# Patient Record
Sex: MALE | Race: WHITE | HISPANIC OR LATINO | URBAN - METROPOLITAN AREA
[De-identification: names, ages, dates, MRNs, and addresses within clinical notes are randomized per-mention and may not be internally consistent; named-entity substitution may affect disease eponyms.]

---

## 2020-06-16 ENCOUNTER — INPATIENT (INPATIENT)
Facility: HOSPITAL | Age: 70
LOS: 5 days | Discharge: HOME | DRG: 726 | End: 2020-06-22
Attending: INTERNAL MEDICINE | Admitting: HOSPITALIST
Payer: MEDICARE

## 2020-06-16 VITALS
RESPIRATION RATE: 18 BRPM | TEMPERATURE: 98 F | HEIGHT: 68.9 IN | WEIGHT: 235.01 LBS | SYSTOLIC BLOOD PRESSURE: 195 MMHG | DIASTOLIC BLOOD PRESSURE: 99 MMHG | OXYGEN SATURATION: 97 % | HEART RATE: 84 BPM

## 2020-06-16 LAB
ALBUMIN SERPL ELPH-MCNC: 4.3 G/DL — SIGNIFICANT CHANGE UP (ref 3.3–5.2)
ALP SERPL-CCNC: 92 U/L — SIGNIFICANT CHANGE UP (ref 40–120)
ALT FLD-CCNC: 34 U/L — SIGNIFICANT CHANGE UP
ANION GAP SERPL CALC-SCNC: 10 MMOL/L — SIGNIFICANT CHANGE UP (ref 5–17)
APPEARANCE UR: ABNORMAL
APTT BLD: 29.7 SEC — SIGNIFICANT CHANGE UP (ref 27.5–36.3)
AST SERPL-CCNC: 36 U/L — SIGNIFICANT CHANGE UP
BACTERIA # UR AUTO: ABNORMAL
BASOPHILS # BLD AUTO: 0.04 K/UL — SIGNIFICANT CHANGE UP (ref 0–0.2)
BASOPHILS NFR BLD AUTO: 0.4 % — SIGNIFICANT CHANGE UP (ref 0–2)
BILIRUB SERPL-MCNC: 0.6 MG/DL — SIGNIFICANT CHANGE UP (ref 0.4–2)
BILIRUB UR-MCNC: NEGATIVE — SIGNIFICANT CHANGE UP
BUN SERPL-MCNC: 8 MG/DL — SIGNIFICANT CHANGE UP (ref 8–20)
CALCIUM SERPL-MCNC: 9.4 MG/DL — SIGNIFICANT CHANGE UP (ref 8.6–10.2)
CHLORIDE SERPL-SCNC: 98 MMOL/L — SIGNIFICANT CHANGE UP (ref 98–107)
CO2 SERPL-SCNC: 27 MMOL/L — SIGNIFICANT CHANGE UP (ref 22–29)
COLOR SPEC: ABNORMAL
CREAT SERPL-MCNC: 0.55 MG/DL — SIGNIFICANT CHANGE UP (ref 0.5–1.3)
DIFF PNL FLD: ABNORMAL
EOSINOPHIL # BLD AUTO: 0.31 K/UL — SIGNIFICANT CHANGE UP (ref 0–0.5)
EOSINOPHIL NFR BLD AUTO: 3.1 % — SIGNIFICANT CHANGE UP (ref 0–6)
GLUCOSE SERPL-MCNC: 140 MG/DL — HIGH (ref 70–99)
GLUCOSE UR QL: NEGATIVE — SIGNIFICANT CHANGE UP
HCT VFR BLD CALC: 43.5 % — SIGNIFICANT CHANGE UP (ref 39–50)
HGB BLD-MCNC: 14.9 G/DL — SIGNIFICANT CHANGE UP (ref 13–17)
IMM GRANULOCYTES NFR BLD AUTO: 0.2 % — SIGNIFICANT CHANGE UP (ref 0–1.5)
INR BLD: 1.1 RATIO — SIGNIFICANT CHANGE UP (ref 0.88–1.16)
KETONES UR-MCNC: ABNORMAL
LEUKOCYTE ESTERASE UR-ACNC: ABNORMAL
LYMPHOCYTES # BLD AUTO: 2.48 K/UL — SIGNIFICANT CHANGE UP (ref 1–3.3)
LYMPHOCYTES # BLD AUTO: 24.5 % — SIGNIFICANT CHANGE UP (ref 13–44)
MCHC RBC-ENTMCNC: 30.3 PG — SIGNIFICANT CHANGE UP (ref 27–34)
MCHC RBC-ENTMCNC: 34.3 GM/DL — SIGNIFICANT CHANGE UP (ref 32–36)
MCV RBC AUTO: 88.6 FL — SIGNIFICANT CHANGE UP (ref 80–100)
MONOCYTES # BLD AUTO: 0.62 K/UL — SIGNIFICANT CHANGE UP (ref 0–0.9)
MONOCYTES NFR BLD AUTO: 6.1 % — SIGNIFICANT CHANGE UP (ref 2–14)
NEUTROPHILS # BLD AUTO: 6.64 K/UL — SIGNIFICANT CHANGE UP (ref 1.8–7.4)
NEUTROPHILS NFR BLD AUTO: 65.7 % — SIGNIFICANT CHANGE UP (ref 43–77)
NITRITE UR-MCNC: POSITIVE
PH UR: 7 — SIGNIFICANT CHANGE UP (ref 5–8)
PLATELET # BLD AUTO: 202 K/UL — SIGNIFICANT CHANGE UP (ref 150–400)
POTASSIUM SERPL-MCNC: 3.8 MMOL/L — SIGNIFICANT CHANGE UP (ref 3.5–5.3)
POTASSIUM SERPL-SCNC: 3.8 MMOL/L — SIGNIFICANT CHANGE UP (ref 3.5–5.3)
PROT SERPL-MCNC: 7.3 G/DL — SIGNIFICANT CHANGE UP (ref 6.6–8.7)
PROT UR-MCNC: 500 MG/DL
PROTHROM AB SERPL-ACNC: 12.5 SEC — SIGNIFICANT CHANGE UP (ref 10–12.9)
RBC # BLD: 4.91 M/UL — SIGNIFICANT CHANGE UP (ref 4.2–5.8)
RBC # FLD: 12.4 % — SIGNIFICANT CHANGE UP (ref 10.3–14.5)
RBC CASTS # UR COMP ASSIST: >50 /HPF (ref 0–4)
SODIUM SERPL-SCNC: 134 MMOL/L — LOW (ref 135–145)
SP GR SPEC: 1 — LOW (ref 1.01–1.02)
UROBILINOGEN FLD QL: NEGATIVE — SIGNIFICANT CHANGE UP
WBC # BLD: 10.11 K/UL — SIGNIFICANT CHANGE UP (ref 3.8–10.5)
WBC # FLD AUTO: 10.11 K/UL — SIGNIFICANT CHANGE UP (ref 3.8–10.5)
WBC UR QL: SIGNIFICANT CHANGE UP

## 2020-06-16 PROCEDURE — 74176 CT ABD & PELVIS W/O CONTRAST: CPT | Mod: 26

## 2020-06-16 PROCEDURE — 99285 EMERGENCY DEPT VISIT HI MDM: CPT

## 2020-06-16 RX ORDER — DIAZEPAM 5 MG
5 TABLET ORAL ONCE
Refills: 0 | Status: DISCONTINUED | OUTPATIENT
Start: 2020-06-16 | End: 2020-06-16

## 2020-06-16 NOTE — ED PROVIDER NOTE - CLINICAL SUMMARY MEDICAL DECISION MAKING FREE TEXT BOX
Pt with disha blood in urine that did not improved with multiple rounds of CBI, pt discussed with hospitalist, that wants to have pt admitted for further evaluation and possible intervention, pt made aware of need for admission and possible further treatments, pt states that they agree with need for admission and they understand all results and possible treatments. PT will be admitted to hospitalist team and care will be transferred to admitting team.

## 2020-06-16 NOTE — ED PROVIDER NOTE - ATTENDING CONTRIBUTION TO CARE
I personally saw the patient with the PA, and completed the key components of the history and physical exam. I then discussed the management plan with the PA.   gen in nad resp clear cardiac no murmru abd mild ttp asupaubic region no g/r/r no cvat neuro intact   agree with pa plan of care

## 2020-06-16 NOTE — ED PROVIDER NOTE - OBJECTIVE STATEMENT
PT with SPMHX of DM, HTN, BPH, presents to the ED with complaint of bloody urine over the last 24 hr. Pt states that he had small amount of blood that has gotten progressively worse darker red and increased amount of clots in urine. Pt states that he is now having difficulty passing urine. Pt states that he is having moderate amount of pain that feels like pressure that is in his lower abd constat progressively worse, improved with urinations, and is non radiating. Pt dines fever, chills, anticoagulation use, SOB, diff breathing, HA, dizziness, cough, back pain.

## 2020-06-16 NOTE — ED ADULT TRIAGE NOTE - CHIEF COMPLAINT QUOTE
pt presents to the ed c/o hematuria that began today, pain on urination, burning, Pt states bright red urine and hx of prostate problems. pt has no other c/o at this time.

## 2020-06-17 DIAGNOSIS — N32.89 OTHER SPECIFIED DISORDERS OF BLADDER: ICD-10-CM

## 2020-06-17 DIAGNOSIS — Z90.49 ACQUIRED ABSENCE OF OTHER SPECIFIED PARTS OF DIGESTIVE TRACT: Chronic | ICD-10-CM

## 2020-06-17 DIAGNOSIS — I10 ESSENTIAL (PRIMARY) HYPERTENSION: ICD-10-CM

## 2020-06-17 DIAGNOSIS — R31.0 GROSS HEMATURIA: ICD-10-CM

## 2020-06-17 DIAGNOSIS — R31.9 HEMATURIA, UNSPECIFIED: ICD-10-CM
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
## 2020-06-17 DIAGNOSIS — E11.9 TYPE 2 DIABETES MELLITUS WITHOUT COMPLICATIONS: ICD-10-CM

## 2020-06-17 LAB
BLD GP AB SCN SERPL QL: SIGNIFICANT CHANGE UP
GLUCOSE BLDC GLUCOMTR-MCNC: 136 MG/DL — HIGH (ref 70–99)
GLUCOSE BLDC GLUCOMTR-MCNC: 155 MG/DL — HIGH (ref 70–99)
GLUCOSE BLDC GLUCOMTR-MCNC: 159 MG/DL — HIGH (ref 70–99)
GLUCOSE BLDC GLUCOMTR-MCNC: 175 MG/DL — HIGH (ref 70–99)
SARS-COV-2 IGG SERPL QL IA: NEGATIVE — SIGNIFICANT CHANGE UP
SARS-COV-2 IGM SERPL IA-ACNC: <0.1 INDEX — SIGNIFICANT CHANGE UP
SARS-COV-2 RNA SPEC QL NAA+PROBE: SIGNIFICANT CHANGE UP

## 2020-06-17 PROCEDURE — 12345: CPT | Mod: NC,GC

## 2020-06-17 PROCEDURE — 99222 1ST HOSP IP/OBS MODERATE 55: CPT

## 2020-06-17 PROCEDURE — 51703 INSERT BLADDER CATH COMPLEX: CPT

## 2020-06-17 PROCEDURE — 99223 1ST HOSP IP/OBS HIGH 75: CPT

## 2020-06-17 RX ORDER — ONDANSETRON 8 MG/1
4 TABLET, FILM COATED ORAL EVERY 6 HOURS
Refills: 0 | Status: DISCONTINUED | OUTPATIENT
Start: 2020-06-17 | End: 2020-06-22

## 2020-06-17 RX ORDER — INSULIN LISPRO 100/ML
2 VIAL (ML) SUBCUTANEOUS
Refills: 0 | Status: DISCONTINUED | OUTPATIENT
Start: 2020-06-17 | End: 2020-06-17

## 2020-06-17 RX ORDER — SODIUM CHLORIDE 9 MG/ML
3 INJECTION INTRAMUSCULAR; INTRAVENOUS; SUBCUTANEOUS EVERY 8 HOURS
Refills: 0 | Status: DISCONTINUED | OUTPATIENT
Start: 2020-06-17 | End: 2020-06-22

## 2020-06-17 RX ORDER — INSULIN GLARGINE 100 [IU]/ML
10 INJECTION, SOLUTION SUBCUTANEOUS AT BEDTIME
Refills: 0 | Status: DISCONTINUED | OUTPATIENT
Start: 2020-06-17 | End: 2020-06-18

## 2020-06-17 RX ORDER — GLUCAGON INJECTION, SOLUTION 0.5 MG/.1ML
1 INJECTION, SOLUTION SUBCUTANEOUS ONCE
Refills: 0 | Status: DISCONTINUED | OUTPATIENT
Start: 2020-06-17 | End: 2020-06-22

## 2020-06-17 RX ORDER — DEXTROSE 50 % IN WATER 50 %
12.5 SYRINGE (ML) INTRAVENOUS ONCE
Refills: 0 | Status: DISCONTINUED | OUTPATIENT
Start: 2020-06-17 | End: 2020-06-22

## 2020-06-17 RX ORDER — TAMSULOSIN HYDROCHLORIDE 0.4 MG/1
0.8 CAPSULE ORAL AT BEDTIME
Refills: 0 | Status: DISCONTINUED | OUTPATIENT
Start: 2020-06-17 | End: 2020-06-22

## 2020-06-17 RX ORDER — METFORMIN HYDROCHLORIDE 850 MG/1
1 TABLET ORAL
Qty: 0 | Refills: 0 | DISCHARGE

## 2020-06-17 RX ORDER — HYDRALAZINE HCL 50 MG
10 TABLET ORAL EVERY 6 HOURS
Refills: 0 | Status: DISCONTINUED | OUTPATIENT
Start: 2020-06-17 | End: 2020-06-22

## 2020-06-17 RX ORDER — GLIMEPIRIDE 1 MG
1 TABLET ORAL
Qty: 0 | Refills: 0 | DISCHARGE

## 2020-06-17 RX ORDER — DEXTROSE 50 % IN WATER 50 %
25 SYRINGE (ML) INTRAVENOUS ONCE
Refills: 0 | Status: DISCONTINUED | OUTPATIENT
Start: 2020-06-17 | End: 2020-06-22

## 2020-06-17 RX ORDER — AMLODIPINE BESYLATE 2.5 MG/1
5 TABLET ORAL DAILY
Refills: 0 | Status: DISCONTINUED | OUTPATIENT
Start: 2020-06-17 | End: 2020-06-17

## 2020-06-17 RX ORDER — AMLODIPINE BESYLATE 2.5 MG/1
10 TABLET ORAL DAILY
Refills: 0 | Status: DISCONTINUED | OUTPATIENT
Start: 2020-06-18 | End: 2020-06-22

## 2020-06-17 RX ORDER — INSULIN LISPRO 100/ML
VIAL (ML) SUBCUTANEOUS
Refills: 0 | Status: DISCONTINUED | OUTPATIENT
Start: 2020-06-17 | End: 2020-06-19

## 2020-06-17 RX ORDER — SODIUM CHLORIDE 9 MG/ML
1000 INJECTION, SOLUTION INTRAVENOUS
Refills: 0 | Status: DISCONTINUED | OUTPATIENT
Start: 2020-06-17 | End: 2020-06-22

## 2020-06-17 RX ORDER — FINASTERIDE 5 MG/1
5 TABLET, FILM COATED ORAL DAILY
Refills: 0 | Status: DISCONTINUED | OUTPATIENT
Start: 2020-06-17 | End: 2020-06-22

## 2020-06-17 RX ORDER — DEXTROSE 50 % IN WATER 50 %
15 SYRINGE (ML) INTRAVENOUS ONCE
Refills: 0 | Status: DISCONTINUED | OUTPATIENT
Start: 2020-06-17 | End: 2020-06-22

## 2020-06-17 RX ADMIN — Medication 2 UNIT(S): at 12:03

## 2020-06-17 RX ADMIN — AMLODIPINE BESYLATE 5 MILLIGRAM(S): 2.5 TABLET ORAL at 05:56

## 2020-06-17 RX ADMIN — Medication 2: at 16:59

## 2020-06-17 RX ADMIN — TAMSULOSIN HYDROCHLORIDE 0.8 MILLIGRAM(S): 0.4 CAPSULE ORAL at 22:48

## 2020-06-17 RX ADMIN — Medication 2: at 22:48

## 2020-06-17 RX ADMIN — Medication 2: at 08:36

## 2020-06-17 RX ADMIN — SODIUM CHLORIDE 3 MILLILITER(S): 9 INJECTION INTRAMUSCULAR; INTRAVENOUS; SUBCUTANEOUS at 05:16

## 2020-06-17 RX ADMIN — Medication 2 UNIT(S): at 08:36

## 2020-06-17 RX ADMIN — INSULIN GLARGINE 10 UNIT(S): 100 INJECTION, SOLUTION SUBCUTANEOUS at 22:47

## 2020-06-17 RX ADMIN — SODIUM CHLORIDE 3 MILLILITER(S): 9 INJECTION INTRAMUSCULAR; INTRAVENOUS; SUBCUTANEOUS at 16:10

## 2020-06-17 RX ADMIN — Medication 20 MILLIGRAM(S): at 05:56

## 2020-06-17 RX ADMIN — SODIUM CHLORIDE 3 MILLILITER(S): 9 INJECTION INTRAMUSCULAR; INTRAVENOUS; SUBCUTANEOUS at 22:48

## 2020-06-17 NOTE — CONSULT NOTE ADULT - ATTENDING COMMENTS
patient had a bustamante which was too small. gross hematuria. Has been seen many months ago by Dr. Wade.  On tamsulosin.  Never on finasteride.  Had weak stream with feeling of incomplete emptying.  Change to a larger 3-way bustamante. Irrigate and CBI  Will try to start in finasteride and wean CBI

## 2020-06-17 NOTE — CHART NOTE - NSCHARTNOTEFT_GEN_A_CORE
70y Male,   Patient is a 70y old  Male who presents with a chief complaint of Gross Hematuria  Bladder Mass (17 Jun 2020 09:45)    INTERVAL/OVERNIGHT EVENTS:    Patient examined at beside.  - Bustamante cath clogged, Urology PA at bedside for bustamante change. 22Fr 3 lumen bustamante placed, actively draining.  Patient states he smoked for 5 years 2-5  cigarettes/ day and stopped 30 years ago, denied h/o , Textile working, Chemical industries, Leather industries, metal exposure, or working at a rubber industry/ painting. Patient also denied h/o parasitic infections in the past. Patient denies STD's.   Patient states he had hematuria 2 years ago and his PCP referred to urology who started him on Flomax, denies cytoscopy. (Uro: Dr. Wade)  Patient states feeling better compared to admission, reports mild discomfort due tot he fistula.  Patient denies chest pain, calf pain, abdominal pain, headaches, fever, chills, diarrhea, constipation, SOB, palpitations.   Rest of ROS not contributory except for above.        I&O's Summary    16 Jun 2020 07:01  -  17 Jun 2020 07:00  --------------------------------------------------------  IN: 3000 mL / OUT: 4750 mL / NET: -1750 mL    17 Jun 2020 07:01  -  17 Jun 2020 15:30  --------------------------------------------------------  IN: 78210 mL / OUT: 19198 mL / NET: -1700 mL    Daily Height in cm: 175 (16 Jun 2020 21:38)    Daily     CAPILLARY BLOOD GLUCOSE  POCT Blood Glucose.: 136 mg/dL (17 Jun 2020 11:50)  POCT Blood Glucose.: 175 mg/dL (17 Jun 2020 08:13)      T(C): 36.8 (06-17-20 @ 07:53), Max: 36.8 (06-17-20 @ 05:55)  HR: 87 (06-17-20 @ 07:53) (76 - 87)  BP: 172/94 (06-17-20 @ 07:53) (160/76 - 195/99)  RR: 18 (06-17-20 @ 07:53) (18 - 20)  SpO2: 96% (06-17-20 @ 07:53) (96% - 98%)      PHYSICAL EXAM  GENERAL: AA&O  x 3. NAD.   HEENT: Head; normocephalic, atraumatic, PERRLA, EOMI  NECK: Supple.   CARDIOVASCULAR: RRR. S1/s2 + No murmurs/ rubs/ gallops  RESPIRATORY: CLTA  GASTROINTESTINAL: Soft, no tenderness on palpation. Bs present on 4 quadrants.   : 3 lumen bustamante actively draining dark pink urine.   EXTREMITIES: No clubbing, cyanosis or edema. No calf tenderness. Distal pulses wnl.   MUSCULOSKELETAL: 5/5 muscle strength in UE and LE.   SKIN: warm and dry with normal turgor.  NEURO: No focal deficits.   PSYCH: normal affect.        LABS                          14.9   10.11 )-----------( 202      ( 16 Jun 2020 23:22 )             43.5         06-16    134<L>  |  98  |  8.0  ----------------------------<  140<H>  3.8   |  27.0  |  0.55    Ca    9.4      16 Jun 2020 23:22    TPro  7.3  /  Alb  4.3  /  TBili  0.6  /  DBili  x   /  AST  36  /  ALT  34  /  AlkPhos  92  06-16            LIVER FUNCTIONS - ( 16 Jun 2020 23:22 )  Alb: 4.3 g/dL / Pro: 7.3 g/dL / ALK PHOS: 92 U/L / ALT: 34 U/L / AST: 36 U/L / GGT: x             PT/INR - ( 16 Jun 2020 23:22 )   PT: 12.5 sec;   INR: 1.10 ratio         PTT - ( 16 Jun 2020 23:22 )  PTT:29.7 sec        IMAGING  < from: CT Renal Stone Hunt (06.16.20 @ 22:50) >    IMPRESSION:   A 4-5 cm area of hyperdensity along the left posterior bladder wall probably represents hematoma with the given history. Soft tissue mass is also possible.    < end of copied text >    MEDICATIONS  (STANDING):  amLODIPine   Tablet 5 milliGRAM(s) Oral daily  dextrose 5%. 1000 milliLiter(s) (50 mL/Hr) IV Continuous <Continuous>  dextrose 50% Injectable 12.5 Gram(s) IV Push once  dextrose 50% Injectable 25 Gram(s) IV Push once  dextrose 50% Injectable 25 Gram(s) IV Push once  enalapril 20 milliGRAM(s) Oral daily  insulin glargine Injectable (LANTUS) 10 Unit(s) SubCutaneous at bedtime  insulin lispro (HumaLOG) corrective regimen sliding scale   SubCutaneous Before meals and at bedtime  insulin lispro Injectable (HumaLOG) 2 Unit(s) SubCutaneous before breakfast  insulin lispro Injectable (HumaLOG) 2 Unit(s) SubCutaneous before lunch  insulin lispro Injectable (HumaLOG) 2 Unit(s) SubCutaneous before dinner  sodium chloride 0.9% lock flush 3 milliLiter(s) IV Push every 8 hours  tamsulosin 0.8 milliGRAM(s) Oral at bedtime    MEDICATIONS  (PRN):  dextrose 40% Gel 15 Gram(s) Oral once PRN Blood Glucose LESS THAN 70 milliGRAM(s)/deciliter  glucagon  Injectable 1 milliGRAM(s) IntraMuscular once PRN Glucose LESS THAN 70 milligrams/deciliter  ondansetron Injectable 4 milliGRAM(s) IV Push every 6 hours PRN Nausea        ASSESSMENT AND PLAN    71 y/o male with history of DM-2, HTN, HLD and BPH, who presented to ED with CC of suprapubic pain, hematuria, and straining worsening for 1 day. At ED patient was found to be on urinary retention, bustamante placed for decompression and CBI. Hyperthropic Prostate on physical exam. CT abdomen showed a 4- 5 cm hyperdense area in the bladder likely a clot but can't r/o a mass. No hydronephrosis on CT. Urology Following, bustamante to be in place with f/u as outpatient for TO. Regarding CT findings, Urology considers it is likely a clot and will need cytoscopy as Outpt. Also noticed on admission Hypertensive urgency, with BP above goal, will increase Norvasc and f/u, Hydralazine PRN.     Acute Urinary retention complicated with hematuria   - Likely 2/2 BPH  - No anemia.   - S/p bustamante.   - C/w CBI  - C/w Flomax  - Urology eval/ recs noted and appreciated    Abnormal radiologic finding of the bladder  - CT abdomen: 4-5 cm area of hyperdensity along the left posterior bladder wall. Clot Vs Soft tissue mass.  - Patient has h/o hematuria in the past.  - No Cystoscopy done as outpt.  - Will require f/u and cystoscopy  - Patient is aware of findings.    Hypertensive Urgency  - Noted on admission.  - Will increase Norvasc to 10 mg QD  - C/w enalapril   - Hydralazine 10 q6 PRN sbp >170  - BP goal considering h/o T2DM is <140/90  - C/w low DASH/ TLC diet    T2DM  - Unknown A1c, f/u   - On Metformin and glimepiride as outpt. Will hold during this admission  - C/w Lantus and ISS +hypoglycemia protocol  - C/w consistent carb diet.     Prophylactic measures:  - dvt ppx: IMPROVE VTE 1(>61 y/o) low risk for DVT. No AC due to Hematuria.     Dispo: LOS likely 1-2 days pending resolution of hematuria.     - Patient requested his spouse (Anum Farley [phone]).  - Contacted Mrs Farley on 06/17 and updated her regarding diagnosis and management. Post midnight admission brief note. Patient was seen and examined by overnight hospitalist this am. HPI reviewed. Labs, vitals noted. I have personally seen and examined this patient today     Patient seen and examined at bedside.   - Bustamante cath clogged, Urology PA at bedside for bustamante change. 22Fr 3 lumen bustamante placed, actively draining.  Patient states he smoked for 5 years 2-5  cigarettes/ day and stopped 30 years ago, denied h/o , Textile working, Chemical industries, Leather industries, metal exposure, or working at a rubber industry/ painting. Patient also denied h/o parasitic infections in the past. Patient denies STD's.   Patient states he had hematuria 2 years ago and his PCP referred to urology who started him on Flomax, denies cytoscopy. (Uro: Dr. Wade)  Patient states feeling better compared to admission, reports mild discomfort due tot he fistula.  Patient denies chest pain, calf pain, abdominal pain, headaches, fever, chills, diarrhea, constipation, SOB, palpitations.   Rest of ROS not contributory except for above.    CAPILLARY BLOOD GLUCOSE  POCT Blood Glucose.: 136 mg/dL (17 Jun 2020 11:50)  POCT Blood Glucose.: 175 mg/dL (17 Jun 2020 08:13)    VS:   T(C): 36.8 (06-17-20 @ 07:53), Max: 36.8 (06-17-20 @ 05:55)  HR: 87 (06-17-20 @ 07:53) (76 - 87)  BP: 172/94 (06-17-20 @ 07:53) (160/76 - 195/99)  RR: 18 (06-17-20 @ 07:53) (18 - 20)  SpO2: 96% (06-17-20 @ 07:53) (96% - 98%)      PHYSICAL EXAM  GENERAL: AA&O  x 3. NAD.   HEENT: Head; normocephalic, atraumatic, PERRLA, EOMI  CARDIOVASCULAR: RRR. S1/s2 + No murmurs/ rubs/ gallops  RESPIRATORY: CTAB   GASTROINTESTINAL: Soft, no tenderness on palpation. Bs present on 4 quadrants.   : 3 lumen bustamante actively draining dark pink urine.   EXTREMITIES: No clubbing, cyanosis or edema. No calf tenderness. Distal pulses wnl.   MUSCULOSKELETAL: 5/5 muscle strength in UE and LE.   SKIN: warm and dry with normal turgor.      LABS                          14.9   10.11 )-----------( 202      ( 16 Jun 2020 23:22 )             43.5         06-16    134<L>  |  98  |  8.0  ----------------------------<  140<H>  3.8   |  27.0  |  0.55    Ca    9.4      16 Jun 2020 23:22    TPro  7.3  /  Alb  4.3  /  TBili  0.6  /  DBili  x   /  AST  36  /  ALT  34  /  AlkPhos  92  06-16    LIVER FUNCTIONS - ( 16 Jun 2020 23:22 )  Alb: 4.3 g/dL / Pro: 7.3 g/dL / ALK PHOS: 92 U/L / ALT: 34 U/L / AST: 36 U/L / GGT: x           PT/INR - ( 16 Jun 2020 23:22 )   PT: 12.5 sec;   INR: 1.10 ratio    PTT - ( 16 Jun 2020 23:22 )  PTT:29.7 sec    IMAGING  < from: CT Renal Stone Hunt (06.16.20 @ 22:50) >    IMPRESSION:   A 4-5 cm area of hyperdensity along the left posterior bladder wall probably represents hematoma with the given history. Soft tissue mass is also possible.    ASSESSMENT AND PLAN    69 y/o male with history of DM-2, HTN, HLD and BPH, who presented to ED with CC of suprapubic pain, hematuria, and straining worsening for 1 day. At ED patient was found to be on urinary retention, bustamante placed for decompression and CBI. Hyperthropic Prostate on physical exam. CT abdomen showed a 4- 5 cm hyperdense area in the bladder likely a clot but can't r/o a mass. No hydronephrosis on CT. Urology Following, bustamante to be in place with f/u as outpatient for TOV. Regarding CT findings, Urology considers it is likely a clot and will need cytoscopy as Outpt. Also noticed on admission Hypertensive urgency, with BP above goal, will increase Norvasc and f/u, Hydralazine PRN.     Acute Urinary retention complicated with hematuria   in the setting of an abnormal hyperdensity in the bladder (mass vs clot)   - Likely 2/2 BPH  - No anemia or UTI sx   - S/p bustamante.   - C/w CBI  - C/w Flomax  - Urology eval/ recs noted and appreciated  -OP cysto findings     Hypertensive Urgency- improving slowly   - Noted on admission.  - Will increase Norvasc to 10 mg QD  - C/w enalapril   - Hydralazine 10 q6 PRN sbp >170  - BP goal considering h/o T2DM is <140/90  - C/w low DASH/ TLC diet    T2DM  - Unknown A1c, f/u   - On Metformin and glimepiride as outpt. Will hold during this admission  - C/w Lantus and ISS +hypoglycemia protocol  - C/w consistent carb diet.     Prophylactic measures:  - dvt ppx: IMPROVE VTE 1(>61 y/o) low risk for DVT. No AC due to Hematuria.     Dispo: LOS likely 1-2 days pending resolution of hematuria.     - Patient requested his spouse (Anum Farley [phone]).  - Contacted Mrs Farley on 06/17 and updated her regarding diagnosis and management.  HPI noted. Will follow.

## 2020-06-17 NOTE — H&P ADULT - NSICDXPASTMEDICALHX_GEN_ALL_CORE_FT
PAST MEDICAL HISTORY:  BPH (benign prostatic hyperplasia)     DM (diabetes mellitus)     HTN (hypertension)

## 2020-06-17 NOTE — PROCEDURE NOTE - NSICDXPROCEDURE_GEN_ALL_CORE_FT
PROCEDURES:  Continuous irrigation of bladder using 3-way Méndez catheter 18-Jun-2020 07:47:07  Jeannie Sethi  Complex catheterization of urethra 18-Jun-2020 07:46:49  Jeannie Sethi

## 2020-06-17 NOTE — PROGRESS NOTE ADULT - ASSESSMENT
71 yo male with BPH, clot retention, hematuria  - bustamante irrigated, some clots removed, unable to irrigate adequately  - bustamante cath changed to 22Fr 3 way, irrigated with 1.5L water, significant amount of clots removed, urine more pink.  CBI restarted, flowing well, urine draining well  - cont CBI at moderate rate  - will monitor and cont. to follow 69 yo male with BPH, clot retention, hematuria  - bustamante irrigated, some clots removed, unable to irrigate adequately  - bustamante cath changed to 22Fr 3 way, irrigated with 1.5L water, significant amount of clots removed, urine more pink.  CBI restarted, flowing well, urine draining well  - cont CBI at moderate rate  - phimosis reduced  - will monitor and cont. to follow

## 2020-06-17 NOTE — CONSULT NOTE ADULT - ASSESSMENT
69yo male with hematuria and findings of CT of left posterior bladder wall mass vs hematoma.   - Continue Méndez with CBI  - Trend H/H  - U/A positive for infection along with hematuria, recommend Macrobid or Bactrim per primary team  - Will consider repeat imaging to further delineate left posterior bladder wall mass vs hematoma  - Urology will continue to follow 69yo male with hematuria and findings of CT of left posterior bladder wall mass vs hematoma.   - Continue Méndez with CBI  - Urine culture  - Trend H/H  - U/A positive for infection along with hematuria, recommend Bactrim   - Will consider repeat imaging to further delineate left posterior bladder wall mass vs hematoma  - Urology will continue to follow  - Plan discussed with Dr. Dc, whom agrees

## 2020-06-17 NOTE — ED ADULT NURSE NOTE - NSIMPLEMENTINTERV_GEN_ALL_ED
Implemented All Universal Safety Interventions:  [place] to call system. Call bell, personal items and telephone within reach. Instruct patient to call for assistance. Room bathroom lighting operational. Non-slip footwear when patient is off stretcher. Physically safe environment: no spills, clutter or unnecessary equipment. Stretcher in lowest position, wheels locked, appropriate side rails in place.

## 2020-06-17 NOTE — PROGRESS NOTE ADULT - SUBJECTIVE AND OBJECTIVE BOX
Subjective:70yMale admitted for gross hematuria and clot retention yesterday.  pt had 3 way 18Fr bustamante in place.  Pt uncomfortable this am, c/o suprapubic pain.  CBI open but not running.    Bustamante: hematuria in bag    Vital Signs Last 24 Hrs  T(C): 36.8 (17 Jun 2020 07:53), Max: 36.8 (17 Jun 2020 05:55)  T(F): 98.2 (17 Jun 2020 07:53), Max: 98.3 (17 Jun 2020 05:55)  HR: 87 (17 Jun 2020 07:53) (76 - 87)  BP: 172/94 (17 Jun 2020 07:53) (160/76 - 195/99)  BP(mean): 104 (17 Jun 2020 05:18) (104 - 104)  RR: 18 (17 Jun 2020 07:53) (18 - 20)  SpO2: 96% (17 Jun 2020 07:53) (96% - 98%)  I&O's Detail    16 Jun 2020 07:01  -  17 Jun 2020 07:00  --------------------------------------------------------  IN:    Continuous Bladder Irrigation: 3000 mL  Total IN: 3000 mL    OUT:    Continuous Bladder Irrigation: 4750 mL  Total OUT: 4750 mL    Total NET: -1750 mL      17 Jun 2020 07:01  -  17 Jun 2020 10:17  --------------------------------------------------------  IN:    Continuous Bladder Irrigation: 3000 mL  Total IN: 3000 mL    OUT:    Continuous Bladder Irrigation: 2700 mL  Total OUT: 2700 mL    Total NET: 300 mL          Labs:                        14.9   10.11 )-----------( 202      ( 16 Jun 2020 23:22 )             43.5     06-16    134<L>  |  98  |  8.0  ----------------------------<  140<H>  3.8   |  27.0  |  0.55    Ca    9.4      16 Jun 2020 23:22    TPro  7.3  /  Alb  4.3  /  TBili  0.6  /  DBili  x   /  AST  36  /  ALT  34  /  AlkPhos  92  06-16    PT/INR - ( 16 Jun 2020 23:22 )   PT: 12.5 sec;   INR: 1.10 ratio         PTT - ( 16 Jun 2020 23:22 )  PTT:29.7 sec

## 2020-06-17 NOTE — PROCEDURE NOTE - NSURITECHNIQUE_GU_A_CORE
The collection bag is below the level of the patient and urinary bladder/Proper hand hygiene was performed/Sterile gloves were worn for the duration of the procedure/A sterile drape was used to cover all adjacent areas/The site was cleaned with soap/water and sterile solution (betadine)/The catheter was secured with a securement device (e.g. StatLock)/The urinary drainage system is closed at the end of the procedure/All applicable medical record documentation is completed/The catheter was appropriately lubricated

## 2020-06-17 NOTE — PROCEDURE NOTE - NSINDICATIONS_GEN_A_CORE
urinry obstruction or retention/non functioning 18Fr catheter/continuous bladder irrigation/bladder distention

## 2020-06-17 NOTE — CONSULT NOTE ADULT - SUBJECTIVE AND OBJECTIVE BOX
UROLOGY CONSULT    Consulting surgical team: Urology  Consulting attending: Dr. Dc  Patient seen and examined: 20 @ 03:08    HPI:  71yo male presents with complaints of hematuria for the past 24 hours causing difficulty urinating and with bouts of bright red blood and clots in his urine. Patient reports mild pain with straining on urination. Reports he has been taking a medication called Anadin for a mild tooth pain for the past 3 days, said medication is an Aspirin containing med. Patient denies weight loss, loss of appettite or previous episodes of hematuria. Currently, denies chest pain, SOB, nausea, emesis, fevers or chills.     PAST MEDICAL HISTORY:  BPH (benign prostatic hyperplasia)  DM (diabetes mellitus)  HTN (hypertension)      PAST SURGICAL HISTORY:  No significant past surgical history      ALLERGIES:  No Known Allergies      FAMILY HISTORY:  noncontributory    SOCIAL HISTORY:  Denies toxic habits    HOME MEDICATIONS:  Glimepiride 4mg BID  Enalapril 20mg daily  Metformin 500mg daily  Tamsulosin 0.8mg daily  Amlodipine 5mg daily    MEDICATIONS  (STANDING):    MEDICATIONS  (PRN):      VITALS & I/Os:  Vital Signs Last 24 Hrs  T(C): 36.7 (2020 21:38), Max: 36.7 (2020 21:38)  T(F): 98.1 (2020 21:38), Max: 98.1 (2020 21:38)  HR: 84 (2020 21:38) (84 - 84)  BP: 195/99 (2020 21:38) (195/99 - 195/99)  BP(mean): --  RR: 18 (2020 21:38) (18 - 18)  SpO2: 97% (2020 21:38) (97% - 97%)  CAPILLARY BLOOD GLUCOSE          I&O's Summary      GENERAL: Alert, in no acute distress.  MENTAL STATUS: AAOx3. Appropriate affect.  HEENT: PERRLA. EOMI  RESPIRATORY: CTAB  CARDIOVASCULAR: RRR  GASTROINTESTINAL: Abdomen soft, NT, ND, -R/-G.  No suprapubic tenderness, testicles palpated b/l, no skin changes noted on genitals, has CBI Méndez placed with pink urine output  MUSCULOSKELETAL: No gross deformities.       LABS:                        14.9   10.11 )-----------( 202      ( 2020 23:22 )             43.5     06-16    134<L>  |  98  |  8.0  ----------------------------<  140<H>  3.8   |  27.0  |  0.55    Ca    9.4      2020 23:22    TPro  7.3  /  Alb  4.3  /  TBili  0.6  /  DBili  x   /  AST  36  /  ALT  34  /  AlkPhos  92  -16    Lactate:    PT/INR - ( 2020 23:22 )   PT: 12.5 sec;   INR: 1.10 ratio         PTT - ( 2020 23:22 )  PTT:29.7 sec          Urinalysis Basic - ( 2020 23:22 )    Color: Red / Appearance: Cloudy / S.005 / pH: x  Gluc: x / Ketone: Trace  / Bili: Negative / Urobili: Negative   Blood: x / Protein: 500 mg/dL / Nitrite: Positive   Leuk Esterase: Trace / RBC: >50 /HPF / WBC 0-2   Sq Epi: x / Non Sq Epi: x / Bacteria: Occasional        IMAGING:  < from: CT Renal Stone Hunt (20 @ 22:50) >     EXAM:  CT RENAL STONE HUNT                          PROCEDURE DATE:  2020          INTERPRETATION:  CLINICAL INFORMATION:  disha blood in urine    COMPARISON: None.    PROCEDURE:   CT of the Abdomen and pelvis was performed without intravenous contrast.   Intravenous contrast: None.  Oral contrast: None.  Sagittal and coronal reformats were performed.    FINDINGS:  LOWER CHEST: Small amount of pleural thickening, calcification, and fluid on the right. Calcified left mediastinal lymph nodes.    LIVER: Fatty, enlarged liver.  BILE DUCTS: Normal caliber.  GALLBLADDER: Cholecystectomy.  SPLEEN: Within normal limits.  PANCREAS: Within normal limits.  ADRENALS: Within normal limits.  KIDNEYS/URETERS: Within normal limits.    Bladder: 3.1 x4.6 x 3.4 cm. Hyperdensity along the left posterior bladder wall.  Reproductive: Enlarged prostate.  BOWEL: Diffuse colonic diverticulosis. Normal appendix.   PERITONEUM: No ascites.  VESSELS: Within normal limits.  RETROPERITONEUM/LYMPH NODES: No lymphadenopathy.    ABDOMINAL WALL: Within normal limits.  BONES: Moderate degenerative disc disease of the thoracolumbar spine. Bilateral L5 pars defects with grade 1 anterolisthesis of L5 upon S1.    IMPRESSION:   A 4-5 cm area of hyperdensity along the left posterior bladder wall probably represents hematoma with the given history. Soft tissue mass is also possible.                AUSTEN AMADOR   This document has been electronically signed. 2020 11:08PM                  < end of copied text >

## 2020-06-17 NOTE — ED ADULT NURSE NOTE - OBJECTIVE STATEMENT
Patient complaining of urinating blood which started today. He states that it started as a small amount of blood progressing to bright red blood this afternoon. Patient denies pain or burning and fever. Pt states he has a history of prostate problems.

## 2020-06-17 NOTE — H&P ADULT - HISTORY OF PRESENT ILLNESS
71 y/o male with history of DM-2, HTN, HLD, BPH presents with 1 day of pain and pressure in suprapubic area associated with gross hematuria. Denies any fever, chills, SOB, CP. No trauma reported. Denies this ever happening before. Denies use of antiplatelet or anti-coagulants. In the ED patient with gross hematuria, U/A with no WBC, normal CBC. CT abd/pelvis showed large bladder hematoma vs bladder mass. CBI catheter placed and patient feels better. Seen by Urology Resident and awaiting formal eval by Urology attending.

## 2020-06-17 NOTE — H&P ADULT - PROBLEM SELECTOR PLAN 1
Admit to medical bed, cont. CBI, monitor H/H, Await urology attending eval for likely cystoscopy to evaluate CT findings. No chemical DVT-P, U/A with 0 WBC not c/w infection. No role for Abx at this time. Send urine for cytology. OOB, Ambulate

## 2020-06-18 ENCOUNTER — TRANSCRIPTION ENCOUNTER (OUTPATIENT)
Age: 70
End: 2020-06-18

## 2020-06-18 LAB
A1C WITH ESTIMATED AVERAGE GLUCOSE RESULT: 8.7 % — HIGH (ref 4–5.6)
ANION GAP SERPL CALC-SCNC: 13 MMOL/L — SIGNIFICANT CHANGE UP (ref 5–17)
BASOPHILS # BLD AUTO: 0.01 K/UL — SIGNIFICANT CHANGE UP (ref 0–0.2)
BASOPHILS NFR BLD AUTO: 0.1 % — SIGNIFICANT CHANGE UP (ref 0–2)
BUN SERPL-MCNC: 13 MG/DL — SIGNIFICANT CHANGE UP (ref 8–20)
CALCIUM SERPL-MCNC: 8.9 MG/DL — SIGNIFICANT CHANGE UP (ref 8.6–10.2)
CHLORIDE SERPL-SCNC: 97 MMOL/L — LOW (ref 98–107)
CO2 SERPL-SCNC: 24 MMOL/L — SIGNIFICANT CHANGE UP (ref 22–29)
CREAT SERPL-MCNC: 0.59 MG/DL — SIGNIFICANT CHANGE UP (ref 0.5–1.3)
EOSINOPHIL # BLD AUTO: 0.09 K/UL — SIGNIFICANT CHANGE UP (ref 0–0.5)
EOSINOPHIL NFR BLD AUTO: 1 % — SIGNIFICANT CHANGE UP (ref 0–6)
ESTIMATED AVERAGE GLUCOSE: 203 MG/DL — HIGH (ref 68–114)
GLUCOSE BLDC GLUCOMTR-MCNC: 171 MG/DL — HIGH (ref 70–99)
GLUCOSE BLDC GLUCOMTR-MCNC: 198 MG/DL — HIGH (ref 70–99)
GLUCOSE BLDC GLUCOMTR-MCNC: 214 MG/DL — HIGH (ref 70–99)
GLUCOSE BLDC GLUCOMTR-MCNC: 219 MG/DL — HIGH (ref 70–99)
GLUCOSE SERPL-MCNC: 237 MG/DL — HIGH (ref 70–99)
HCT VFR BLD CALC: 42.4 % — SIGNIFICANT CHANGE UP (ref 39–50)
HCV AB S/CO SERPL IA: 0.08 S/CO — SIGNIFICANT CHANGE UP (ref 0–0.99)
HCV AB SERPL-IMP: SIGNIFICANT CHANGE UP
HGB BLD-MCNC: 14 G/DL — SIGNIFICANT CHANGE UP (ref 13–17)
IMM GRANULOCYTES NFR BLD AUTO: 0.5 % — SIGNIFICANT CHANGE UP (ref 0–1.5)
LYMPHOCYTES # BLD AUTO: 2.11 K/UL — SIGNIFICANT CHANGE UP (ref 1–3.3)
LYMPHOCYTES # BLD AUTO: 23.8 % — SIGNIFICANT CHANGE UP (ref 13–44)
MAGNESIUM SERPL-MCNC: 2 MG/DL — SIGNIFICANT CHANGE UP (ref 1.6–2.6)
MCHC RBC-ENTMCNC: 29.7 PG — SIGNIFICANT CHANGE UP (ref 27–34)
MCHC RBC-ENTMCNC: 33 GM/DL — SIGNIFICANT CHANGE UP (ref 32–36)
MCV RBC AUTO: 90 FL — SIGNIFICANT CHANGE UP (ref 80–100)
MONOCYTES # BLD AUTO: 0.6 K/UL — SIGNIFICANT CHANGE UP (ref 0–0.9)
MONOCYTES NFR BLD AUTO: 6.8 % — SIGNIFICANT CHANGE UP (ref 2–14)
NEUTROPHILS # BLD AUTO: 6.01 K/UL — SIGNIFICANT CHANGE UP (ref 1.8–7.4)
NEUTROPHILS NFR BLD AUTO: 67.8 % — SIGNIFICANT CHANGE UP (ref 43–77)
PHOSPHATE SERPL-MCNC: 2.3 MG/DL — LOW (ref 2.4–4.7)
PLATELET # BLD AUTO: 180 K/UL — SIGNIFICANT CHANGE UP (ref 150–400)
POTASSIUM SERPL-MCNC: 3.9 MMOL/L — SIGNIFICANT CHANGE UP (ref 3.5–5.3)
POTASSIUM SERPL-SCNC: 3.9 MMOL/L — SIGNIFICANT CHANGE UP (ref 3.5–5.3)
RBC # BLD: 4.71 M/UL — SIGNIFICANT CHANGE UP (ref 4.2–5.8)
RBC # FLD: 12.3 % — SIGNIFICANT CHANGE UP (ref 10.3–14.5)
SODIUM SERPL-SCNC: 134 MMOL/L — LOW (ref 135–145)
WBC # BLD: 8.86 K/UL — SIGNIFICANT CHANGE UP (ref 3.8–10.5)
WBC # FLD AUTO: 8.86 K/UL — SIGNIFICANT CHANGE UP (ref 3.8–10.5)

## 2020-06-18 PROCEDURE — 99233 SBSQ HOSP IP/OBS HIGH 50: CPT | Mod: GC

## 2020-06-18 RX ORDER — AMLODIPINE BESYLATE 2.5 MG/1
1 TABLET ORAL
Qty: 30 | Refills: 0
Start: 2020-06-18 | End: 2020-07-17

## 2020-06-18 RX ORDER — FINASTERIDE 5 MG/1
1 TABLET, FILM COATED ORAL
Qty: 30 | Refills: 0
Start: 2020-06-18 | End: 2020-07-17

## 2020-06-18 RX ORDER — INSULIN GLARGINE 100 [IU]/ML
12 INJECTION, SOLUTION SUBCUTANEOUS AT BEDTIME
Refills: 0 | Status: DISCONTINUED | OUTPATIENT
Start: 2020-06-18 | End: 2020-06-19

## 2020-06-18 RX ADMIN — SODIUM CHLORIDE 3 MILLILITER(S): 9 INJECTION INTRAMUSCULAR; INTRAVENOUS; SUBCUTANEOUS at 21:47

## 2020-06-18 RX ADMIN — ONDANSETRON 4 MILLIGRAM(S): 8 TABLET, FILM COATED ORAL at 17:43

## 2020-06-18 RX ADMIN — Medication 4: at 21:44

## 2020-06-18 RX ADMIN — Medication 2: at 11:52

## 2020-06-18 RX ADMIN — INSULIN GLARGINE 12 UNIT(S): 100 INJECTION, SOLUTION SUBCUTANEOUS at 22:02

## 2020-06-18 RX ADMIN — SODIUM CHLORIDE 3 MILLILITER(S): 9 INJECTION INTRAMUSCULAR; INTRAVENOUS; SUBCUTANEOUS at 11:22

## 2020-06-18 RX ADMIN — Medication 4: at 08:05

## 2020-06-18 RX ADMIN — Medication 62.5 MILLIMOLE(S): at 17:47

## 2020-06-18 RX ADMIN — TAMSULOSIN HYDROCHLORIDE 0.8 MILLIGRAM(S): 0.4 CAPSULE ORAL at 21:43

## 2020-06-18 RX ADMIN — SODIUM CHLORIDE 3 MILLILITER(S): 9 INJECTION INTRAMUSCULAR; INTRAVENOUS; SUBCUTANEOUS at 05:11

## 2020-06-18 RX ADMIN — FINASTERIDE 5 MILLIGRAM(S): 5 TABLET, FILM COATED ORAL at 11:24

## 2020-06-18 RX ADMIN — Medication 20 MILLIGRAM(S): at 05:08

## 2020-06-18 RX ADMIN — Medication 2: at 16:52

## 2020-06-18 NOTE — CHART NOTE - NSCHARTNOTEFT_GEN_A_CORE
CTSP by nurse    Pt c/o bladder pain, feels clogged up    nurse able to irrigate bustamante for few small clots    pt still c/o pain    PE: Pt seen lying supine with HOB up, VSS, afebrile    CBI off, bustamante draining blood tinged urine    **bustamante manually irrigated with return of few small clots,     CBI restarted at slow drip,     urine remaining clear    will reassess shortly for resolution of bladder pain, may be having bladder spasms

## 2020-06-18 NOTE — DISCHARGE NOTE PROVIDER - PROVIDER TOKENS
PROVIDER:[TOKEN:[[id number]]] PROVIDER:[TOKEN:[[id number]]],FREE:[LAST:[Edda-Chacha],FIRST:[Madina],PHONE:[(   )    -],FAX:[(   )    -],ADDRESS:[Primary Care Doctor  Address: [address] [place] [address]  Phone: [phone]]],FREE:[LAST:[Mauro],FIRST:[Juarez ROD MD],PHONE:[(   )    -],FAX:[(   )    -],ADDRESS:[Address: [address] [place] [address]  Phone: [phone]]]

## 2020-06-18 NOTE — DISCHARGE NOTE PROVIDER - NSDCFUADDINST_GEN_ALL_CORE_FT
- Activity as tolerated.  - Drink plenty of water - Activity as tolerated.  - Drink plenty of water  - Follow up with Urology within 1 week. Cystoscopy is strongly recommended.   - Follow up with Primary care doctor within 1 week. Bring discharge papers with you.

## 2020-06-18 NOTE — DISCHARGE NOTE PROVIDER - NSDCMRMEDTOKEN_GEN_ALL_CORE_FT
Flomax: 0.8 milligram(s) orally once a day  glimepiride 4 mg oral tablet: 1 tab(s) orally once a day  metFORMIN 500 mg oral tablet, extended release: 1 tab(s) orally once a day  Norvasc 5 mg oral tablet: 1 tab(s) orally once a day  Vasotec 20 mg oral tablet: 1 tab(s) orally once a day amLODIPine 10 mg oral tablet: 1 tab(s) orally once a day  finasteride 5 mg oral tablet: 1 tab(s) orally once a day  glimepiride 4 mg oral tablet: 1 tab(s) orally once a day  metFORMIN 500 mg oral tablet, extended release: 1 tab(s) orally once a day  tamsulosin 0.4 mg oral capsule: 2 cap(s) orally once a day (at bedtime)   Vasotec 20 mg oral tablet: 1 tab(s) orally once a day

## 2020-06-18 NOTE — DISCHARGE NOTE PROVIDER - CARE PROVIDER_API CALL
Desmond Bess  [address] [address]  Phone: [phone]  Fax: [phone]  Follow Up Time: [place] [address]  Phone: [phone]  Fax: [phone]  Follow Up Time:     Madina Finn  Primary Care Doctor  Address: [address] [place] [address]  Phone: [phone]  Phone: (   )    -  Fax: (   )    -  Follow Up Time:     Juarez Wade MD  Address: [address] [place] [address]  Phone: [phone]  Phone: (   )    -  Fax: (   )    -  Follow Up Time:

## 2020-06-18 NOTE — DISCHARGE NOTE PROVIDER - NSDCCPCAREPLAN_GEN_ALL_CORE_FT
PRINCIPAL DISCHARGE DIAGNOSIS  Diagnosis: Acute urinary retention  Assessment and Plan of Treatment: You presented with urinary retention that required a bustamante to be placed. A CT scan of your abdomen was done in order to rule out kidney stones or orther causes of obstruction. The urinary retention is likely secondary to the increase in size of your prostate. The bustamante cathether has to remain in place and a trial of voiding will have to be done at your urologist office, as there is a risk of you goign back to urinary retention the bustamante is removed at this time. Continue takin Finasteride and Flomax as prescribed.      SECONDARY DISCHARGE DIAGNOSES  Diagnosis: BPH (benign prostatic hyperplasia)  Assessment and Plan of Treatment: Your prostate is increased in size and it obstructed the urine flow. You were started on Finasteride in order to help decrease the size of your prostate and try to prevent further growth. Continue taking flomax as ordered by your urologist.    Diagnosis: Type 2 diabetes mellitus  Assessment and Plan of Treatment: Your A1c is 8.2, meaning your diabetes is not well controlled, it is recommended a A1c of 7 or below to decrease risk of complications from diabetes (kidney and eye disease, neuropathy, amputations). Continue taking your medications as prescribed by your primary care doctor and follow up with him within 1 week for medication adjustment and lifestyle changes that will benefit decreasing your sugar levels.    Diagnosis: Hypertensive urgency  Assessment and Plan of Treatment: You were found to have elevated blood pressures on admission. You blood presure is now controlled. Continue taking Atenolol and Amlodipine as prescribed by [facility] doctor. Follow a low salt / low fat diet.    Diagnosis: Abnormal CT scan, bladder  Assessment and Plan of Treatment: You were found to have a hypersdense area inside your urinary bladder and it is unclear at this time if this is a blood clot or a mass. For this reason is it very important that you follow up with your Urologist as a Cystoscopy is strongly recommended in order to see your bladder from the inside. This procedure is done as outpatient. Follow up with your urologist within 1 week.    Diagnosis: Hematuria  Assessment and Plan of Treatment: You were found to have large clots in your urine. A bustamante was placed and your bladder was irrigated continuously in order to clean all the clots. At the time of your discharge your urine appears clean. Follow up with your urologist for further monitoring and management. PRINCIPAL DISCHARGE DIAGNOSIS  Diagnosis: Acute urinary retention  Assessment and Plan of Treatment: You presented with urinary retention that required a bustamante to be placed. A CT scan of your abdomen was done in order to rule out kidney stones or orther causes of obstruction. The urinary retention is likely secondary to the increase in size of your prostate. The bustamante cathether has to remain in place and a trial of voiding will have to be done at your urologist office, as there is a risk of you goign back to urinary retention the bustamante is removed at this time. Continue takin Finasteride and Flomax as prescribed.      SECONDARY DISCHARGE DIAGNOSES  Diagnosis: Abnormal CT scan, bladder  Assessment and Plan of Treatment: You were found to have a hypersdense area inside your urinary bladder and it is unclear at this time if this is a blood clot or a mass. For this reason is it very important that you follow up with your Urologist as a Cystoscopy is strongly recommended in order to see your bladder from the inside. This procedure is done as outpatient. Follow up with your urologist within 1 week.    Diagnosis: Intestinal obstruction  Assessment and Plan of Treatment: This is an obstruction in the small or large intestine, causing difficulty in passing digested material normally through the bowel. It is unclear why you had it, likely resulted from decreased ambulation, and the urinary problem you had, as well as constipation. A xray of your belly showed the obstruction resolved and you are having bowel movements at time of your discharge. No surgery was required and no further interventions are needed at this time. Follow up with your primary care doctor for furthe rmonitoring.    Diagnosis: BPH (benign prostatic hyperplasia)  Assessment and Plan of Treatment: Your prostate is increased in size and it obstructed the urine flow. You were started on Finasteride in order to help decrease the size of your prostate and try to prevent further growth. Continue taking flomax as ordered by your urologist.    Diagnosis: Type 2 diabetes mellitus  Assessment and Plan of Treatment: Your A1c is 8.2, meaning your diabetes is not well controlled, it is recommended a A1c of 7 or below to decrease risk of complications from diabetes (kidney and eye disease, neuropathy, amputations). Continue taking your medications as prescribed by your primary care doctor and follow up with him within 1 week for medication adjustment and lifestyle changes that will benefit decreasing your sugar levels.    Diagnosis: Hypertensive urgency  Assessment and Plan of Treatment: You were found to have elevated blood pressures on admission. You blood presure is now controlled. Continue taking Atenolol and Amlodipine as prescribed by our doctor. Follow a low salt / low fat diet.    Diagnosis: Hematuria  Assessment and Plan of Treatment: You were found to have large clots in your urine. A bustamante was placed and your bladder was irrigated continuously in order to clean all the clots. At the time of your discharge your urine appears clean. Follow up with your urologist for further monitoring and management.

## 2020-06-18 NOTE — DISCHARGE NOTE PROVIDER - HOSPITAL COURSE
69 y/o male with history of DM-2, HTN, HLD and BPH, who presented to ED with CC of suprapubic pain, hematuria, and straining worsening for 1 day. At ED patient was found to be on urinary retention, bustamante placed for decompression and CBI. Hyperthropic Prostate on physical exam. CT abdomen showed a 4- 5 cm hyperdense area in the bladder likely a clot but can't r/o a mass. No hydronephrosis on CT. Urology Following, bustamante to be in place with f/u as outpatient for TOV. Regarding CT findings, Urology considers it is likely a clot and will need cytoscopy as Outpt. Also noticed on admission Hypertensive urgency, with BP above goal, will increase Norvasc and f/u, Hydralazine PRN.         Acute Urinary retention complicated with hematuria     in the setting of an abnormal hyperdensity in the bladder (mass vs clot)     - Likely 2/2 BPH    - No anemia or UTI sx     - S/p bustamante.     - C/w CBI    - C/w Flomax    - Urology eval/ recs noted and appreciated    -OP cysto findings         Hypertensive Urgency- improving slowly     - Noted on admission.    - Will increase Norvasc to 10 mg QD    - C/w enalapril 71 y/o male with history of DM-2, HTN, HLD and BPH, who presented to ED with CC of suprapubic pain, hematuria, and straining worsening for 1 day. At ED patient was found to be on urinary retention, bustamante placed for decompression and CBI. Hyperthropic Prostate on physical exam. CT abdomen showed a 4- 5 cm hyperdense area in the bladder likely a clot but can't r/o a mass. No hydronephrosis on CT. Urology Followed, bustamante to be in place with f/u as outpatient for TOV.  Regarding CT findings, as per urology hyperdensity is likely a clot. Cystoscopy strongly recommended due to h/o hematuria >2 years. Also noticed on admission Hypertensive urgency, resolved. BP above goal, increased amlodipine to 10mg QD, with improvement of BP measures. Noted A1c 8.7,         CT abnormal findings discussed with patient, and as per patient request also discussed with spouse. Importance of Urology/ PCP follow up explained. Patient and family expressed understanding and agreed to plan.         Patient is medically optimized for discharge home with Bustamante in place. Outpatient follow up with PCP and urologist within 1 week        Estimated time for discharge plannin minutes.        Vital Signs Last 24 Hrs    T(C): 36.8 (2020 07:55), Max: 36.9 (2020 15:58)    T(F): 98.3 (2020 07:55), Max: 98.4 (2020 15:58)    HR: 91 (2020 10:42) (74 - 91)    BP: 129/74 (2020 10:42) (94/69 - 164/97)    RR: 18 (2020 07:55) (18 - 18)    SpO2: 92% (2020 07:55) (92% - 98%)        PHYSICAL EXAM    GENERAL: AA&O  x 3. NAD.     HEENT: Head; normocephalic, atraumatic, PERRLA, EOMI    CARDIOVASCULAR: RRR. S1/s2 + No murmurs/ rubs/ gallops    RESPIRATORY: CTAB     GASTROINTESTINAL: Soft, no tenderness on palpation. Bs present on 4 quadrants.     : 3 lumen bustamante actively draining clear urine.     EXTREMITIES: No clubbing, cyanosis or edema. No calf tenderness. Distal pulses wnl.     MUSCULOSKELETAL: 5/5 muscle strength in UE and LE.     SKIN: warm and dry with normal turgor. 69 y/o male with history of DM-2, HTN, HLD and BPH, who presented to ED with CC of suprapubic pain, hematuria, and straining worsening for 1 day. At ED patient was found to be on urinary retention, bustamante placed for decompression and CBI. Hyperthropic Prostate on physical exam. CT abdomen showed a 4- 5 cm hyperdense area in the bladder likely a clot but can't r/o a mass. No hydronephrosis on CT. Urology Followed, bustamante removed on , post void bladder scan ~200, Urology ok with d/c w/o bustamante and strongly recommended cystoscopy as outpatient. Outpatient Urology contacted, aware of hospital admission. .  Regarding CT findings, as per urology hyperdensity is likely a clot. Cystoscopy strongly recommended due to h/o hematuria >2 years. Also noticed on admission Hypertensive urgency, resolved. BP above goal, increased amlodipine to 10mg QD. BP controlled. Hospital course complicated with  GI obstruction on , unclear etiology likely Constipation Vs Urinary retention/ Bladder distension. KUB showed gastric outlet obstruction. NGT placed for 24 hours. Surgery followed, s/p gastrograpin challenge, obstruction resolved. At time of discharge patient is stooling approprietly.  Noted A1c 8.7, patient made aware.         CT abnormal findings discussed with patient, and as per patient request also discussed with spouse. Importance of Urology/ PCP follow up explained. Patient and family expressed understanding and agreed to plan.         Patient is medically optimized for discharge home with Bustamante in place. Outpatient follow up with PCP and urologist within 1 week        Estimated time for discharge plannin minutes.        Vital Signs Last 24 Hrs    T(C): 36.9 (2020 07:30), Max: 37.1 (2020 15:27)    T(F): 98.5 (2020 07:30), Max: 98.8 (2020 23:50)    HR: 73 (2020 07:30) (68 - 87)    BP: 128/73 (2020 07:30) (111/73 - 143/68)    RR: 18 (2020 07:30) (18 - 18)    SpO2: 92% (2020 07:30) (91% - 92%)            GENERAL: AA&O x4. Not in acute distress.    HEENT: Midly dry oral mucosa.  clear sclera b/l.     CARDIOVASCULAR: RRR, S1/S2+. No edema.    RESPIRATORY: Lungs are clear to auscultation B/l    GASTROINTESTINAL: Abdomen is soft, non tended, mildly distended. Bowel sounds present on 4 quadrants    EXTREMITIES: No clubbing, cyanosis or edema. No calf tenderness. Distal pulses wnl.     MUSCULOSKELETAL: 5/5 muscle strength in UE and LE.     SKIN: warm and dry with normal turgor. 71 y/o male with history of DM-2, HTN, HLD and BPH, who presented to ED with CC of suprapubic pain, hematuria, and straining worsening for 1 day. At ED patient was found to be on urinary retention, bustamante placed for decompression and CBI. Hyperthropic Prostate on physical exam. CT abdomen showed a 4- 5 cm hyperdense area in the bladder likely a clot but can't r/o a mass. No hydronephrosis on CT. Urology Followed, bustamante removed on , post void bladder scan ~200, Urology ok with d/c w/o bustamante and strongly recommended cystoscopy as outpatient. Outpatient Urology contacted, aware of hospital admission. .  Regarding CT findings, as per urology hyperdensity is likely a clot. Cystoscopy strongly recommended due to h/o hematuria >2 years. Also noticed on admission Hypertensive urgency, resolved. BP above goal, increased amlodipine to 10mg QD. BP controlled. Hospital course complicated with  GI obstruction on , unclear etiology likely Constipation Vs Urinary retention/ Bladder distension. KUB showed gastric outlet obstruction. NGT placed for 24 hours. Surgery followed, s/p gastrograpin challenge, obstruction resolved. At time of discharge patient is stooling approprietly.  Noted A1c 8.7, patient made aware.         CT abnormal findings discussed with patient, and as per patient request also discussed with spouse. Importance of Urology/ PCP follow up explained. Patient and family expressed understanding and agreed to plan.         Patient is medically optimized for discharge home with Bustamante in place. Outpatient follow up with PCP and urologist within 1 week        Vital Signs Last 24 Hrs    T(C): 36.9 (2020 07:30), Max: 37.1 (2020 15:27)    T(F): 98.5 (2020 07:30), Max: 98.8 (2020 23:50)    HR: 73 (2020 07:30) (68 - 87)    BP: 128/73 (2020 07:30) (111/73 - 143/68)    RR: 18 (2020 07:30) (18 - 18)    SpO2: 92% (2020 07:30) (91% - 92%)            GENERAL: AA&O x4. Not in acute distress.    HEENT: Midly dry oral mucosa.  clear sclera b/l.     CARDIOVASCULAR: RRR, S1/S2+. No edema.    RESPIRATORY: Lungs are clear to auscultation B/l    GASTROINTESTINAL: Abdomen is soft, non tended, mildly distended. Bowel sounds present on 4 quadrants    EXTREMITIES: No clubbing, cyanosis or edema. No calf tenderness. Distal pulses wnl.     MUSCULOSKELETAL: 5/5 muscle strength in UE and LE.     SKIN: warm and dry with normal turgor.        Estimated time for discharge plannin  minutes.

## 2020-06-18 NOTE — PROGRESS NOTE ADULT - SUBJECTIVE AND OBJECTIVE BOX
70y Male,   Patient is a 70y old  Male who presents with a chief complaint of Gross Hematuria  Bladder Mass (18 Jun 2020 10:26)    INTERVAL/OVERNIGHT EVENTS:    Patient examined at beside. No acute events over night   Patient had clear urine during the night then started with clots in the morning associated with suprapubic pain.  Patient is tolerating PO, +Flatus, no bowel movement in 24h.  Patient denies chest pain, calf pain, headaches, fever, chills, dysuria, hematuria, diarrhea, constipation, SOB, palpitations.   Rest of ROS not contributory except for above.      I&O's Summary    17 Jun 2020 07:01  -  18 Jun 2020 07:00  --------------------------------------------------------  IN: 26815 mL / OUT: 97695 mL / NET: -13365 mL    18 Jun 2020 07:01  -  18 Jun 2020 16:12  --------------------------------------------------------  IN: 3000 mL / OUT: 2400 mL / NET: 600 mL        CAPILLARY BLOOD GLUCOSE  POCT Blood Glucose.: 171 mg/dL (18 Jun 2020 11:51)  POCT Blood Glucose.: 214 mg/dL (18 Jun 2020 07:55)  POCT Blood Glucose.: 159 mg/dL (17 Jun 2020 22:45)  POCT Blood Glucose.: 155 mg/dL (17 Jun 2020 16:51)    VITALS    T(C): 36.8 (06-18-20 @ 07:55), Max: 36.8 (06-18-20 @ 07:55)  HR: 91 (06-18-20 @ 10:42) (88 - 91)  BP: 129/74 (06-18-20 @ 10:42) (94/69 - 129/74)  RR: 18 (06-18-20 @ 07:55) (18 - 18)  SpO2: 92% (06-18-20 @ 07:55) (92% - 96%)    PHYSICAL EXAM:    PHYSICAL EXAM  GENERAL: AA&O  x 3. NAD.   HEENT: Head; normocephalic, atraumatic, PERRLA, EOMI  CARDIOVASCULAR: RRR. S1/s2 + No murmurs/ rubs/ gallops  RESPIRATORY: CTAB   GASTROINTESTINAL: Soft, no tenderness on palpation. Bs present on 4 quadrants.   : 3 lumen bustamante actively draining dark pink urine.   EXTREMITIES: No clubbing, cyanosis or edema. No calf tenderness. Distal pulses wnl.   MUSCULOSKELETAL: 5/5 muscle strength in UE and LE.   SKIN: warm and dry with normal turgor.    LABS                          14.0   8.86  )-----------( 180      ( 18 Jun 2020 09:29 )             42.4         06-18    134<L>  |  97<L>  |  13.0  ----------------------------<  237<H>  3.9   |  24.0  |  0.59    Ca    8.9      18 Jun 2020 09:29  Phos  2.3     06-18  Mg     2.0     06-18    TPro  7.3  /  Alb  4.3  /  TBili  0.6  /  DBili  x   /  AST  36  /  ALT  34  /  AlkPhos  92  06-16      LIVER FUNCTIONS - ( 16 Jun 2020 23:22 )  Alb: 4.3 g/dL / Pro: 7.3 g/dL / ALK PHOS: 92 U/L / ALT: 34 U/L / AST: 36 U/L / GGT: x             PT/INR - ( 16 Jun 2020 23:22 )   PT: 12.5 sec;   INR: 1.10 ratio         PTT - ( 16 Jun 2020 23:22 )  PTT:29.7 sec      Culture - Urine (collected 17 Jun 2020 08:13)  Source: .Urine Clean Catch (Midstream)  Preliminary Report (18 Jun 2020 07:52):    10,000 - 49,000 CFU/mL Gram Negative Rods      MEDICATIONS  (STANDING):  amLODIPine   Tablet 10 milliGRAM(s) Oral daily  dextrose 5%. 1000 milliLiter(s) (50 mL/Hr) IV Continuous <Continuous>  dextrose 50% Injectable 12.5 Gram(s) IV Push once  dextrose 50% Injectable 25 Gram(s) IV Push once  dextrose 50% Injectable 25 Gram(s) IV Push once  enalapril 20 milliGRAM(s) Oral daily  finasteride 5 milliGRAM(s) Oral daily  insulin glargine Injectable (LANTUS) 10 Unit(s) SubCutaneous at bedtime  insulin lispro (HumaLOG) corrective regimen sliding scale   SubCutaneous Before meals and at bedtime  sodium chloride 0.9% lock flush 3 milliLiter(s) IV Push every 8 hours  tamsulosin 0.8 milliGRAM(s) Oral at bedtime    MEDICATIONS  (PRN):  dextrose 40% Gel 15 Gram(s) Oral once PRN Blood Glucose LESS THAN 70 milliGRAM(s)/deciliter  glucagon  Injectable 1 milliGRAM(s) IntraMuscular once PRN Glucose LESS THAN 70 milligrams/deciliter  hydrALAZINE Injectable 10 milliGRAM(s) IV Push every 6 hours PRN SBP >170  ondansetron Injectable 4 milliGRAM(s) IV Push every 6 hours PRN Nausea 70y Male,   Patient is a 70y old  Male who presents with a chief complaint of Gross Hematuria  Bladder Mass (18 Jun 2020 10:26)    INTERVAL/OVERNIGHT EVENTS:    Patient seen and examined at bedside. No acute distress and no acute overnight events.   Patient had clear urine during the night then started with clots in the morning associated with suprapubic pain.  Patient is tolerating PO, +Flatus, no bowel movement in 24h.    ROS - Patient denies chest pain, calf pain, headaches, fever, chills, dysuria, hematuria, diarrhea, constipation, SOB, palpitations.   Rest of ROS not contributory except for above.    CAPILLARY BLOOD GLUCOSE  POCT Blood Glucose.: 171 mg/dL (18 Jun 2020 11:51)  POCT Blood Glucose.: 214 mg/dL (18 Jun 2020 07:55)  POCT Blood Glucose.: 159 mg/dL (17 Jun 2020 22:45)  POCT Blood Glucose.: 155 mg/dL (17 Jun 2020 16:51)    VITALS    T(C): 36.8 (06-18-20 @ 07:55), Max: 36.8 (06-18-20 @ 07:55)  HR: 91 (06-18-20 @ 10:42) (88 - 91)  BP: 129/74 (06-18-20 @ 10:42) (94/69 - 129/74)  RR: 18 (06-18-20 @ 07:55) (18 - 18)  SpO2: 92% (06-18-20 @ 07:55) (92% - 96%)    PHYSICAL EXAM:    PHYSICAL EXAM  GENERAL: AA&O  x 3. NAD.   HEENT: Head; normocephalic, atraumatic, PERRLA, EOMI  CARDIOVASCULAR: RRR. S1/s2 + No murmurs/ rubs/ gallops  RESPIRATORY: CTAB   GASTROINTESTINAL: Soft, no tenderness on palpation. Bs present on 4 quadrants.   : 3 lumen bustamante actively draining dark pink urine.   EXTREMITIES: No clubbing, cyanosis or edema. No calf tenderness. Distal pulses wnl.   MUSCULOSKELETAL: 5/5 muscle strength in UE and LE.   SKIN: warm and dry with normal turgor.    LABS                          14.0   8.86  )-----------( 180      ( 18 Jun 2020 09:29 )             42.4         06-18    134<L>  |  97<L>  |  13.0  ----------------------------<  237<H>  3.9   |  24.0  |  0.59    Ca    8.9      18 Jun 2020 09:29  Phos  2.3     06-18  Mg     2.0     06-18    TPro  7.3  /  Alb  4.3  /  TBili  0.6  /  DBili  x   /  AST  36  /  ALT  34  /  AlkPhos  92  06-16      LIVER FUNCTIONS - ( 16 Jun 2020 23:22 )  Alb: 4.3 g/dL / Pro: 7.3 g/dL / ALK PHOS: 92 U/L / ALT: 34 U/L / AST: 36 U/L / GGT: x             PT/INR - ( 16 Jun 2020 23:22 )   PT: 12.5 sec;   INR: 1.10 ratio         PTT - ( 16 Jun 2020 23:22 )  PTT:29.7 sec      Culture - Urine (collected 17 Jun 2020 08:13)  Source: .Urine Clean Catch (Midstream)  Preliminary Report (18 Jun 2020 07:52):    10,000 - 49,000 CFU/mL Gram Negative Rods      MEDICATIONS  (STANDING):  amLODIPine   Tablet 10 milliGRAM(s) Oral daily  dextrose 5%. 1000 milliLiter(s) (50 mL/Hr) IV Continuous <Continuous>  dextrose 50% Injectable 12.5 Gram(s) IV Push once  dextrose 50% Injectable 25 Gram(s) IV Push once  dextrose 50% Injectable 25 Gram(s) IV Push once  enalapril 20 milliGRAM(s) Oral daily  finasteride 5 milliGRAM(s) Oral daily  insulin glargine Injectable (LANTUS) 10 Unit(s) SubCutaneous at bedtime  insulin lispro (HumaLOG) corrective regimen sliding scale   SubCutaneous Before meals and at bedtime  sodium chloride 0.9% lock flush 3 milliLiter(s) IV Push every 8 hours  tamsulosin 0.8 milliGRAM(s) Oral at bedtime    MEDICATIONS  (PRN):  dextrose 40% Gel 15 Gram(s) Oral once PRN Blood Glucose LESS THAN 70 milliGRAM(s)/deciliter  glucagon  Injectable 1 milliGRAM(s) IntraMuscular once PRN Glucose LESS THAN 70 milligrams/deciliter  hydrALAZINE Injectable 10 milliGRAM(s) IV Push every 6 hours PRN SBP >170  ondansetron Injectable 4 milliGRAM(s) IV Push every 6 hours PRN Nausea

## 2020-06-18 NOTE — PROGRESS NOTE ADULT - ASSESSMENT
71 y/o male with history of DM-2, HTN, HLD and BPH, who presented to ED with CC of suprapubic pain, hematuria, and straining worsening for 1 day. At ED patient was found to be on urinary retention, bustamante placed for decompression and CBI. Hyperthropic Prostate on physical exam. CT abdomen showed a 4- 5 cm hyperdense area in the bladder likely a clot but can't r/o a mass. No hydronephrosis on CT. Urology Following, bustamante to be in place with f/u as outpatient for TOV. Regarding CT findings, Urology considers it is likely a clot and will need cytoscopy as Outpt. Also noticed on admission Hypertensive urgency, now resolved with BP at goal.     Acute Urinary retention complicated with hematuria   in the setting of an abnormal hyperdensity in the bladder (mass vs clot)   - Likely 2/2 BPH  - No anemia or UTI sx   - S/p bustamante.   - C/w CBI  - C/w Flomax  - Urology eval/ recs noted and appreciated  -OP cysto findings     Hypertensive Urgency- resolved.   - Noted on admission.  - c/w  Norvasc - C/w enalapril   - Hydralazine 10 q6 PRN sbp >170  - BP goal considering h/o T2DM is <140/90  - C/w low DASH/ TLC diet    T2DM  - Unknown A1c, f/u   - On Metformin and glimepiride as outpt. Will hold during this admission  - C/w Lantus and ISS +hypoglycemia protocol  - C/w consistent carb diet.     Prophylactic measures:  - dvt ppx: IMPROVE VTE 1(>61 y/o) low risk for DVT. No AC due to Hematuria.     Dispo: LOS likely 1-2 days pending resolution of hematuria.     - Patient requested his spouse (Anum Farley [phone]).  - Contacted Mrs Farley on 06/17 and updated her regarding diagnosis and management. 71 y/o male with history of DM-2, HTN, HLD and BPH, who presented to ED with CC of suprapubic pain, hematuria, and straining worsening for 1 day. At ED patient was found to be on urinary retention, bustamante placed for decompression and CBI. Hyperthropic Prostate on physical exam. CT abdomen showed a 4- 5 cm hyperdense area in the bladder likely a clot but can't r/o a mass. No hydronephrosis on CT. Urology Following, bustamante to be in place with f/u as outpatient for TOV. Regarding CT findings, Urology considers it is likely a clot and will need cytoscopy as Outpt. Also noticed on admission Hypertensive urgency, now resolved with BP at goal.     Acute Urinary retention complicated with hematuria   in the setting of an abnormal hyperdensity in the bladder (mass vs clot)   - Likely 2/2 BPH, s/p bustamante in place. CBI held, patient started to get some suprapubic burning/pain  -will monitor and f/up - may start forming clots/bleeding again   - C/w Flomax  - Urology eval/ recs noted and appreciated  -OP cysto findings     Hypophosphatemia - replete IV and f/up labs    Hypertensive Urgency- resolved.   - Noted on admission.  - c/w  Norvasc - C/w enalapril   - Hydralazine 10 q6 PRN sbp >170  - BP goal considering h/o T2DM is <140/90  - C/w low DASH/ TLC diet    T2DM  a1c 8.7  - On Metformin and glimepiride as outpt. Will hold during this admission  - C/w Lantus and ISS +hypoglycemia protocol  - C/w consistent carb diet.     Prophylactic measures:  - dvt ppx: IMPROVE VTE 1(>59 y/o) low risk for DVT. No AC due to Hematuria.     Dispo: LOS likely 1-2 days pending resolution of hematuria.     - Patient requested his spouse (Anum Farley [phone]).  - Contacted Mrs Farley on 06/17 and updated her regarding diagnosis and management.

## 2020-06-19 LAB
-  AMIKACIN: SIGNIFICANT CHANGE UP
-  AMPICILLIN/SULBACTAM: SIGNIFICANT CHANGE UP
-  AMPICILLIN: SIGNIFICANT CHANGE UP
-  AZTREONAM: SIGNIFICANT CHANGE UP
-  CEFAZOLIN: SIGNIFICANT CHANGE UP
-  CEFEPIME: SIGNIFICANT CHANGE UP
-  CEFOXITIN: SIGNIFICANT CHANGE UP
-  CEFTRIAXONE: SIGNIFICANT CHANGE UP
-  CIPROFLOXACIN: SIGNIFICANT CHANGE UP
-  ERTAPENEM: SIGNIFICANT CHANGE UP
-  GENTAMICIN: SIGNIFICANT CHANGE UP
-  IMIPENEM: SIGNIFICANT CHANGE UP
-  LEVOFLOXACIN: SIGNIFICANT CHANGE UP
-  MEROPENEM: SIGNIFICANT CHANGE UP
-  NITROFURANTOIN: SIGNIFICANT CHANGE UP
-  PIPERACILLIN/TAZOBACTAM: SIGNIFICANT CHANGE UP
-  TIGECYCLINE: SIGNIFICANT CHANGE UP
-  TOBRAMYCIN: SIGNIFICANT CHANGE UP
-  TRIMETHOPRIM/SULFAMETHOXAZOLE: SIGNIFICANT CHANGE UP
ANION GAP SERPL CALC-SCNC: 13 MMOL/L — SIGNIFICANT CHANGE UP (ref 5–17)
BASOPHILS # BLD AUTO: 0.02 K/UL — SIGNIFICANT CHANGE UP (ref 0–0.2)
BASOPHILS NFR BLD AUTO: 0.2 % — SIGNIFICANT CHANGE UP (ref 0–2)
BUN SERPL-MCNC: 23 MG/DL — HIGH (ref 8–20)
CALCIUM SERPL-MCNC: 9.3 MG/DL — SIGNIFICANT CHANGE UP (ref 8.6–10.2)
CHLORIDE SERPL-SCNC: 94 MMOL/L — LOW (ref 98–107)
CO2 SERPL-SCNC: 28 MMOL/L — SIGNIFICANT CHANGE UP (ref 22–29)
CREAT SERPL-MCNC: 0.75 MG/DL — SIGNIFICANT CHANGE UP (ref 0.5–1.3)
CULTURE RESULTS: SIGNIFICANT CHANGE UP
EOSINOPHIL # BLD AUTO: 0.02 K/UL — SIGNIFICANT CHANGE UP (ref 0–0.5)
EOSINOPHIL NFR BLD AUTO: 0.2 % — SIGNIFICANT CHANGE UP (ref 0–6)
GLUCOSE BLDC GLUCOMTR-MCNC: 117 MG/DL — HIGH (ref 70–99)
GLUCOSE BLDC GLUCOMTR-MCNC: 151 MG/DL — HIGH (ref 70–99)
GLUCOSE BLDC GLUCOMTR-MCNC: 168 MG/DL — HIGH (ref 70–99)
GLUCOSE BLDC GLUCOMTR-MCNC: 177 MG/DL — HIGH (ref 70–99)
GLUCOSE SERPL-MCNC: 183 MG/DL — HIGH (ref 70–99)
HCT VFR BLD CALC: 47.3 % — SIGNIFICANT CHANGE UP (ref 39–50)
HGB BLD-MCNC: 15.8 G/DL — SIGNIFICANT CHANGE UP (ref 13–17)
IMM GRANULOCYTES NFR BLD AUTO: 0.5 % — SIGNIFICANT CHANGE UP (ref 0–1.5)
LYMPHOCYTES # BLD AUTO: 17.4 % — SIGNIFICANT CHANGE UP (ref 13–44)
LYMPHOCYTES # BLD AUTO: 2.29 K/UL — SIGNIFICANT CHANGE UP (ref 1–3.3)
MAGNESIUM SERPL-MCNC: 2.3 MG/DL — SIGNIFICANT CHANGE UP (ref 1.6–2.6)
MCHC RBC-ENTMCNC: 29.6 PG — SIGNIFICANT CHANGE UP (ref 27–34)
MCHC RBC-ENTMCNC: 33.4 GM/DL — SIGNIFICANT CHANGE UP (ref 32–36)
MCV RBC AUTO: 88.6 FL — SIGNIFICANT CHANGE UP (ref 80–100)
METHOD TYPE: SIGNIFICANT CHANGE UP
MONOCYTES # BLD AUTO: 0.87 K/UL — SIGNIFICANT CHANGE UP (ref 0–0.9)
MONOCYTES NFR BLD AUTO: 6.6 % — SIGNIFICANT CHANGE UP (ref 2–14)
NEUTROPHILS # BLD AUTO: 9.92 K/UL — HIGH (ref 1.8–7.4)
NEUTROPHILS NFR BLD AUTO: 75.1 % — SIGNIFICANT CHANGE UP (ref 43–77)
ORGANISM # SPEC MICROSCOPIC CNT: SIGNIFICANT CHANGE UP
ORGANISM # SPEC MICROSCOPIC CNT: SIGNIFICANT CHANGE UP
PHOSPHATE SERPL-MCNC: 3.8 MG/DL — SIGNIFICANT CHANGE UP (ref 2.4–4.7)
PLATELET # BLD AUTO: 221 K/UL — SIGNIFICANT CHANGE UP (ref 150–400)
POTASSIUM SERPL-MCNC: 4 MMOL/L — SIGNIFICANT CHANGE UP (ref 3.5–5.3)
POTASSIUM SERPL-SCNC: 4 MMOL/L — SIGNIFICANT CHANGE UP (ref 3.5–5.3)
RBC # BLD: 5.34 M/UL — SIGNIFICANT CHANGE UP (ref 4.2–5.8)
RBC # FLD: 12.4 % — SIGNIFICANT CHANGE UP (ref 10.3–14.5)
SODIUM SERPL-SCNC: 135 MMOL/L — SIGNIFICANT CHANGE UP (ref 135–145)
SPECIMEN SOURCE: SIGNIFICANT CHANGE UP
WBC # BLD: 13.18 K/UL — HIGH (ref 3.8–10.5)
WBC # FLD AUTO: 13.18 K/UL — HIGH (ref 3.8–10.5)

## 2020-06-19 PROCEDURE — 71045 X-RAY EXAM CHEST 1 VIEW: CPT | Mod: 26

## 2020-06-19 PROCEDURE — 74018 RADEX ABDOMEN 1 VIEW: CPT | Mod: 26

## 2020-06-19 PROCEDURE — 71045 X-RAY EXAM CHEST 1 VIEW: CPT | Mod: 26,77

## 2020-06-19 PROCEDURE — 99233 SBSQ HOSP IP/OBS HIGH 50: CPT | Mod: GC

## 2020-06-19 RX ORDER — INSULIN LISPRO 100/ML
VIAL (ML) SUBCUTANEOUS EVERY 6 HOURS
Refills: 0 | Status: DISCONTINUED | OUTPATIENT
Start: 2020-06-19 | End: 2020-06-20

## 2020-06-19 RX ORDER — INSULIN LISPRO 100/ML
VIAL (ML) SUBCUTANEOUS
Refills: 0 | Status: DISCONTINUED | OUTPATIENT
Start: 2020-06-19 | End: 2020-06-19

## 2020-06-19 RX ORDER — POLYETHYLENE GLYCOL 3350 17 G/17G
17 POWDER, FOR SOLUTION ORAL ONCE
Refills: 0 | Status: COMPLETED | OUTPATIENT
Start: 2020-06-19 | End: 2020-06-19

## 2020-06-19 RX ORDER — INSULIN LISPRO 100/ML
VIAL (ML) SUBCUTANEOUS AT BEDTIME
Refills: 0 | Status: DISCONTINUED | OUTPATIENT
Start: 2020-06-19 | End: 2020-06-20

## 2020-06-19 RX ORDER — SODIUM CHLORIDE 9 MG/ML
1000 INJECTION, SOLUTION INTRAVENOUS
Refills: 0 | Status: DISCONTINUED | OUTPATIENT
Start: 2020-06-19 | End: 2020-06-20

## 2020-06-19 RX ADMIN — SODIUM CHLORIDE 3 MILLILITER(S): 9 INJECTION INTRAMUSCULAR; INTRAVENOUS; SUBCUTANEOUS at 05:21

## 2020-06-19 RX ADMIN — SODIUM CHLORIDE 100 MILLILITER(S): 9 INJECTION, SOLUTION INTRAVENOUS at 15:31

## 2020-06-19 RX ADMIN — SODIUM CHLORIDE 3 MILLILITER(S): 9 INJECTION INTRAMUSCULAR; INTRAVENOUS; SUBCUTANEOUS at 12:19

## 2020-06-19 RX ADMIN — ONDANSETRON 4 MILLIGRAM(S): 8 TABLET, FILM COATED ORAL at 10:20

## 2020-06-19 RX ADMIN — SODIUM CHLORIDE 3 MILLILITER(S): 9 INJECTION INTRAMUSCULAR; INTRAVENOUS; SUBCUTANEOUS at 21:59

## 2020-06-19 RX ADMIN — ONDANSETRON 4 MILLIGRAM(S): 8 TABLET, FILM COATED ORAL at 00:36

## 2020-06-19 RX ADMIN — Medication 2: at 08:35

## 2020-06-19 NOTE — PROGRESS NOTE ADULT - SUBJECTIVE AND OBJECTIVE BOX
Subjective:70yMale admitted with hematuria, clot retention.  Pt had CBI restarted last night, urine has remained clear, no clots. Pt gets occasional suprapubic pain, most likely having bladder spasms    Méndez: light yellow    Vital Signs Last 24 Hrs  T(C): 36.8 (19 Jun 2020 07:35), Max: 37 (18 Jun 2020 18:16)  T(F): 98.3 (19 Jun 2020 07:35), Max: 98.6 (18 Jun 2020 18:16)  HR: 81 (19 Jun 2020 07:35) (81 - 94)  BP: 151/80 (19 Jun 2020 07:35) (125/82 - 166/98)  BP(mean): --  RR: 18 (19 Jun 2020 07:35) (18 - 18)  SpO2: 94% (19 Jun 2020 07:35) (91% - 95%)  I&O's Detail    18 Jun 2020 07:01  -  19 Jun 2020 07:00  --------------------------------------------------------  IN:    Continuous Bladder Irrigation: 6800 mL    IV PiggyBack: 250 mL  Total IN: 7050 mL    OUT:    Continuous Bladder Irrigation: 6750 mL    Voided: 2050 mL  Total OUT: 8800 mL    Total NET: -1750 mL      19 Jun 2020 07:01  -  19 Jun 2020 10:33  --------------------------------------------------------  IN:    Continuous Bladder Irrigation: 4550 mL  Total IN: 4550 mL    OUT:    Continuous Bladder Irrigation: 4500 mL  Total OUT: 4500 mL    Total NET: 50 mL          Labs:                        14.0   8.86  )-----------( 180      ( 18 Jun 2020 09:29 )             42.4     06-18    134<L>  |  97<L>  |  13.0  ----------------------------<  237<H>  3.9   |  24.0  |  0.59    Ca    8.9      18 Jun 2020 09:29  Phos  2.3     06-18  Mg     2.0     06-18            Culture - Urine (collected 17 Jun 2020 08:13)  Source: .Urine Clean Catch (Midstream)  Final Report (19 Jun 2020 10:20):    10,000 - 49,000 CFU/mL Morganella morganii  Organism: Morganella morganii (19 Jun 2020 10:20)  Organism: Morganella morganii (19 Jun 2020 10:20)

## 2020-06-19 NOTE — PROGRESS NOTE ADULT - SUBJECTIVE AND OBJECTIVE BOX
70y Male,   Patient is a 70y old  Male who presents with a chief complaint of Gross Hematuria  Bladder Mass (19 Jun 2020 12:21)    INTERVAL/OVERNIGHT EVENTS:    Patient examined at beside.  Patient had 3 episodes of vomiting during the night with abdominal distention.  Patient reports poor appetite since yesterday afternoon. States he was passing gas during the day yesterday but no more flatus since last night. Denies bowel bowel movements since 06/16, states he does not hae h/o constipation.   Patient denies chest pain, calf pain, abdominal pain, headaches, fever, chills, dysuria, hematuria, diarrhea, constipation, SOB, palpitations.   Rest of ROS not contributory except for above.      I&O's Summary    18 Jun 2020 07:01  -  19 Jun 2020 07:00  --------------------------------------------------------  IN: 7050 mL / OUT: 8800 mL / NET: -1750 mL    19 Jun 2020 07:01  -  19 Jun 2020 12:45  --------------------------------------------------------  IN: 4550 mL / OUT: 4500 mL / NET: 50 mL        CAPILLARY BLOOD GLUCOSE  POCT Blood Glucose.: 177 mg/dL (19 Jun 2020 08:07)  POCT Blood Glucose.: 219 mg/dL (18 Jun 2020 21:34)  POCT Blood Glucose.: 198 mg/dL (18 Jun 2020 16:29)    VITALS  T(C): 36.8 (06-19-20 @ 07:35), Max: 37 (06-18-20 @ 18:16)  HR: 81 (06-19-20 @ 07:35) (81 - 94)  BP: 151/80 (06-19-20 @ 07:35) (125/82 - 166/98)  RR: 18 (06-19-20 @ 07:35) (18 - 18)  SpO2: 94% (06-19-20 @ 07:35) (91% - 95%)      PHYSICAL EXAM:  GENERAL: AA x4. Not faisal cute distress.  HEENT: moist oral mucosa, clear sclera b/l.   NECK: Supple.   CARDIOVASCULAR: RRR, S1/S2+. No edema.  RESPIRATORY: Lungs are clear to auscultation B/l  GASTROINTESTINAL: Abdomen is distended, tympanic to percussion on epigastrium, Bowel sounds decreased in freq. and intensity, almost imperceptible   EXTREMITIES: No clubbing, cyanosis or edema. No calf tenderness. Distal pulses wnl.   MUSCULOSKELETAL: 5/5 muscle strength in UE and LE.   SKIN: warm and dry with normal turgor.  NEURO: No focal deficits.   PSYCH: normal affect.    LABS                          15.8   13.18 )-----------( 221      ( 19 Jun 2020 10:10 )             47.3         06-19    135  |  94<L>  |  23.0<H>  ----------------------------<  183<H>  4.0   |  28.0  |  0.75    Ca    9.3      19 Jun 2020 10:10  Phos  3.8     06-19  Mg     2.3     06-19    Culture - Urine (collected 17 Jun 2020 08:13)  Source: .Urine Clean Catch (Midstream)  Final Report (19 Jun 2020 10:20):    10,000 - 49,000 CFU/mL Morganella morganii  Organism: Morganella morganii (19 Jun 2020 10:20)  Organism: Morganella morganii (19 Jun 2020 10:20)    IMAGING        MEDICATIONS  (STANDING):  amLODIPine   Tablet 10 milliGRAM(s) Oral daily  dextrose 5%. 1000 milliLiter(s) (50 mL/Hr) IV Continuous <Continuous>  dextrose 50% Injectable 12.5 Gram(s) IV Push once  dextrose 50% Injectable 25 Gram(s) IV Push once  dextrose 50% Injectable 25 Gram(s) IV Push once  enalapril 20 milliGRAM(s) Oral daily  finasteride 5 milliGRAM(s) Oral daily  sodium chloride 0.9% lock flush 3 milliLiter(s) IV Push every 8 hours  tamsulosin 0.8 milliGRAM(s) Oral at bedtime    MEDICATIONS  (PRN):  dextrose 40% Gel 15 Gram(s) Oral once PRN Blood Glucose LESS THAN 70 milliGRAM(s)/deciliter  glucagon  Injectable 1 milliGRAM(s) IntraMuscular once PRN Glucose LESS THAN 70 milligrams/deciliter  hydrALAZINE Injectable 10 milliGRAM(s) IV Push every 6 hours PRN SBP >170  ondansetron Injectable 4 milliGRAM(s) IV Push every 6 hours PRN Nausea 70y Male,   Patient is a 70y old  Male who presents with a chief complaint of Gross Hematuria  Bladder Mass (19 Jun 2020 12:21)    INTERVAL/OVERNIGHT EVENTS:  Patient seen and examined at bedside.   Patient had 3 episodes of vomiting during the night with abdominal distention.  Patient reports poor appetite since yesterday afternoon. States he was passing gas during the day yesterday but no more flatus since last night. Denies bowel bowel movements since 06/16, states he does not hae h/o constipation.     Patient denies chest pain, calf pain, abdominal pain, headaches, fever, chills, dysuria, hematuria, diarrhea, constipation, SOB, palpitations.   Rest of ROS not contributory except for above.    CAPILLARY BLOOD GLUCOSE  POCT Blood Glucose.: 177 mg/dL (19 Jun 2020 08:07)  POCT Blood Glucose.: 219 mg/dL (18 Jun 2020 21:34)  POCT Blood Glucose.: 198 mg/dL (18 Jun 2020 16:29)    VITALS  T(C): 36.8 (06-19-20 @ 07:35), Max: 37 (06-18-20 @ 18:16)  HR: 81 (06-19-20 @ 07:35) (81 - 94)  BP: 151/80 (06-19-20 @ 07:35) (125/82 - 166/98)  RR: 18 (06-19-20 @ 07:35) (18 - 18)  SpO2: 94% (06-19-20 @ 07:35) (91% - 95%)      PHYSICAL EXAM:  GENERAL: AA x4. Not faisal cute distress.  HEENT: moist oral mucosa, clear sclera b/l.   CARDIOVASCULAR: RRR, S1/S2+. No edema.  RESPIRATORY: Lungs are clear to auscultation B/l  GASTROINTESTINAL: Abdomen is distended, tympanic to percussion on epigastrium, Bowel sounds decreased in freq. and intensity, almost imperceptible   EXTREMITIES: No clubbing, cyanosis or edema. No calf tenderness. Distal pulses wnl.   MUSCULOSKELETAL: 5/5 muscle strength in UE and LE.   SKIN: warm and dry with normal turgor.  NEURO: No focal deficits.     LABS                          15.8   13.18 )-----------( 221      ( 19 Jun 2020 10:10 )             47.3         06-19    135  |  94<L>  |  23.0<H>  ----------------------------<  183<H>  4.0   |  28.0  |  0.75    Ca    9.3      19 Jun 2020 10:10  Phos  3.8     06-19  Mg     2.3     06-19    Culture - Urine (collected 17 Jun 2020 08:13)  Source: .Urine Clean Catch (Midstream)  Final Report (19 Jun 2020 10:20):    10,000 - 49,000 CFU/mL Morganella morganii  Organism: Morganella morganii (19 Jun 2020 10:20)  Organism: Morganella morganii (19 Jun 2020 10:20)    CXR - ngt placement noted   AXR - distended epigastric either stomach vs intestine - pending official read     MEDICATIONS  (STANDING):  amLODIPine   Tablet 10 milliGRAM(s) Oral daily  dextrose 5%. 1000 milliLiter(s) (50 mL/Hr) IV Continuous <Continuous>  dextrose 50% Injectable 12.5 Gram(s) IV Push once  dextrose 50% Injectable 25 Gram(s) IV Push once  dextrose 50% Injectable 25 Gram(s) IV Push once  enalapril 20 milliGRAM(s) Oral daily  finasteride 5 milliGRAM(s) Oral daily  sodium chloride 0.9% lock flush 3 milliLiter(s) IV Push every 8 hours  tamsulosin 0.8 milliGRAM(s) Oral at bedtime    MEDICATIONS  (PRN):  dextrose 40% Gel 15 Gram(s) Oral once PRN Blood Glucose LESS THAN 70 milliGRAM(s)/deciliter  glucagon  Injectable 1 milliGRAM(s) IntraMuscular once PRN Glucose LESS THAN 70 milligrams/deciliter  hydrALAZINE Injectable 10 milliGRAM(s) IV Push every 6 hours PRN SBP >170  ondansetron Injectable 4 milliGRAM(s) IV Push every 6 hours PRN Nausea

## 2020-06-19 NOTE — CONSULT NOTE ADULT - ASSESSMENT
70 year old male admitted to medicine for hematuria. Surgery consulted for possible small bowel obstruction as evidence on KUB    -recommend NGT to low continuous wall suction  -pain control; avoid narcotics  -serial abdominal exams  -recommend enemas  -await return of bowel function  -encourage ambulation

## 2020-06-19 NOTE — CONSULT NOTE ADULT - SUBJECTIVE AND OBJECTIVE BOX
full consult to follow    HD stable. abdomen      70 year old male with distended stomach on imaging with multiple episodes of emesis this morning. passing gas and no bowel movement for 3 days. soft stool palpated on rectal exam     -recommend replacement of NGT with [place] sump  -serial abdominal exams  -encourage ambulation  -await return of bowel function  -surgery to follow 70 year old male admitted to medicine for hematuria with new onset episodes of emesis this AM. passing flatus, but no BM in 4 days. associated with periumbilcal pain.  no sick contacts or recent travel.     ROS: denies fevers, chills, chest pain, shortness of breath, palpitations.     PMH: DM, HTN, HLD, BPH  PSH: laparoscopic cholecystectomy   medications; as listed  Allergies: NKDA  FH: noncontributory    MEDICATIONS  (STANDING):  amLODIPine   Tablet 10 milliGRAM(s) Oral daily  dextrose 5%. 1000 milliLiter(s) (50 mL/Hr) IV Continuous <Continuous>  dextrose 50% Injectable 12.5 Gram(s) IV Push once  dextrose 50% Injectable 25 Gram(s) IV Push once  dextrose 50% Injectable 25 Gram(s) IV Push once  enalapril 20 milliGRAM(s) Oral daily  finasteride 5 milliGRAM(s) Oral daily  insulin lispro (HumaLOG) corrective regimen sliding scale   SubCutaneous at bedtime  insulin lispro (HumaLOG) corrective regimen sliding scale   SubCutaneous every 6 hours  multiple electrolytes Injection Type 1 1000 milliLiter(s) (100 mL/Hr) IV Continuous <Continuous>  sodium chloride 0.9% lock flush 3 milliLiter(s) IV Push every 8 hours  tamsulosin 0.8 milliGRAM(s) Oral at bedtime    MEDICATIONS  (PRN):  dextrose 40% Gel 15 Gram(s) Oral once PRN Blood Glucose LESS THAN 70 milliGRAM(s)/deciliter  glucagon  Injectable 1 milliGRAM(s) IntraMuscular once PRN Glucose LESS THAN 70 milligrams/deciliter  hydrALAZINE Injectable 10 milliGRAM(s) IV Push every 6 hours PRN SBP >170  ondansetron Injectable 4 milliGRAM(s) IV Push every 6 hours PRN Nausea      Vital Signs Last 24 Hrs  T(C): 36.7 (20 Jun 2020 00:00), Max: 37 (19 Jun 2020 16:22)  T(F): 98.1 (20 Jun 2020 00:00), Max: 98.6 (19 Jun 2020 16:22)  HR: 92 (20 Jun 2020 00:00) (81 - 93)  BP: 128/87 (20 Jun 2020 00:00) (125/82 - 166/98)  BP(mean): --  RR: 19 (20 Jun 2020 00:00) (18 - 19)  SpO2: 95% (20 Jun 2020 00:00) (93% - 95%)    Physical Exam:    general: no acute distres, AOx3  Respiratory: Breath Sounds equal & clear to auscultation, no accessory muscle use  Cardiovascular: Regular rate & rhythm, normal S1, S2; no murmurs, gallops or rubs  Gastrointestinal: Soft, non-tender, nondistended. no rebound/guarding or rigidity  DONALD: no external abnormalities;soft brown stool in rectal vault    Extremities: No peripheral edema, No cyanosis, clubbing     Vascular: Equal and normal pulses: 2+ peripheral pulses throughout    Musculoskeletal: No joint pain, swelling or deformity; no limitation of movement    Skin: No rashes      I&O's Detail    18 Jun 2020 07:01  -  19 Jun 2020 07:00  --------------------------------------------------------  IN:    Continuous Bladder Irrigation: 6800 mL    IV PiggyBack: 250 mL  Total IN: 7050 mL    OUT:    Continuous Bladder Irrigation: 6750 mL    Voided: 2050 mL  Total OUT: 8800 mL    Total NET: -1750 mL      19 Jun 2020 07:01  -  20 Jun 2020 00:28  --------------------------------------------------------  IN:    Continuous Bladder Irrigation: 4550 mL    multiple electrolytes Injection Type 1: 300 mL  Total IN: 4850 mL    OUT:    Continuous Bladder Irrigation: 4500 mL    Voided: 200 mL  Total OUT: 4700 mL    Total NET: 150 mL          LABS:                        15.8   13.18 )-----------( 221      ( 19 Jun 2020 10:10 )             47.3     06-19    135  |  94<L>  |  23.0<H>  ----------------------------<  183<H>  4.0   |  28.0  |  0.75    Ca    9.3      19 Jun 2020 10:10  Phos  3.8     06-19  Mg     2.3     06-19            RADIOLOGY & ADDITIONAL STUDIES:    KUB: suspect gastric outlet obstruction

## 2020-06-19 NOTE — PROGRESS NOTE ADULT - ASSESSMENT
69 y/o male with history of DM-2, HTN, HLD and BPH, who presented to ED with CC of suprapubic pain, hematuria, and straining worsening for 1 day. At ED patient was found to be on urinary retention, bustamante placed for decompression and CBI. Hyperthropic Prostate on physical exam. CT abdomen showed a 4- 5 cm hyperdense area in the bladder likely a clot but can't r/o a mass. No hydronephrosis on CT. Urology Following, bustamante to be in place with f/u as outpatient for TOV. Regarding CT findings, Urology considers it is likely a clot and will need cytoscopy as Outpt. Also noticed on admission Hypertensive urgency, now resolved with BP at goal.     Ileus of unclear etiology Constipation Vs Urinary retention/ Bladder distension  - S/p NGT with 100cc of bilious fluid drainage.  - No electrolyte abnormalities no peritoneal irritation signs.   - Pre NGT KUB and CXR post KUB pending report.  - NPO  - Zofran PRN nausea/ vomiting.   - Ambulation encouraged.   - Surgery following, recs noted and appreciated.    Acute Urinary retention complicated with hematuria  - Hematuria resolved.    in the setting of an abnormal hyperdensity in the bladder (mass vs clot)   - Likely 2/2 BPH, s/p bustamante.  - Bustamante removed on 06/19 by urology, bladder scan 6 hours after removal or after first episode of voiding  - C/w Flomax  - Urology eval/ recs noted and appreciated  -OP cysto findings     Hypophosphatemia - resolved.     Hypertensive Urgency- resolved.   - Noted on admission.  - c/w  Norvasc - C/w enalapril   - Hydralazine 10 q6 PRN sbp >170  - BP goal considering h/o T2DM is <140/90  - C/w low DASH/ TLC diet    T2DM  a1c 8.7  - On Metformin and glimepiride as outpt. Will hold during this admission  - Hold Lantus/ ISS while NPO  - Q6 FS while npo. +hypoglycemia protocol      Prophylactic measures:  - dvt ppx: IMPROVE VTE 1(>59 y/o) low risk for DVT. No AC due to Hematuria.     Dispo: LOS likely 1-2 days pending resolution of hematuria.     - Patient requested his spouse (Anum Farley [phone]).  - Spouse contacted on 06/19. All questions answered. 69 y/o male with history of DM-2, HTN, HLD and BPH, who presented to ED with CC of suprapubic pain, hematuria, and straining worsening for 1 day. At ED patient was found to be on urinary retention, bustamante placed for decompression and CBI. Hyperthropic Prostate on physical exam. CT abdomen showed a 4- 5 cm hyperdense area in the bladder likely a clot but can't r/o a mass. No hydronephrosis on CT. Urology Following, bustamante to be in place with f/u as outpatient for TOV. Regarding CT findings, Urology considers it is likely a clot and will need cytoscopy as Outpt. Also noticed on admission Hypertensive urgency, now resolved with BP at goal.     Ileus of unclear etiology Constipation Vs Urinary retention/ Bladder distension  - S/p NGT with 100cc of bilious fluid drainage.  - No electrolyte abnormalities no peritoneal irritation signs.   - Pre NGT KUB and CXR post KUB pending report.  - NPO  - Zofran PRN nausea/ vomiting.   - Ambulation encouraged.  - If no improvement, will proceed with CT  - Surgery following, recs noted and appreciated.    Acute Urinary retention complicated with hematuria  - Hematuria resolved.    in the setting of an abnormal hyperdensity in the bladder (mass vs clot)   - Likely 2/2 BPH, s/p bustamante.  - Bustamante removed on 06/19 by urology, bladder scan 6 hours after removal or after first episode of voiding  - C/w Flomax  - Urology eval/ recs noted and appreciated  -OP cysto findings     Hypophosphatemia - resolved.     Hypertensive Urgency- resolved.   - Noted on admission.  - c/w  Norvasc - C/w enalapril   - Hydralazine 10 q6 PRN sbp >170  - BP goal considering h/o T2DM is <140/90  - C/w low DASH/ TLC diet    T2DM  a1c 8.7  - On Metformin and glimepiride as outpt. Will hold during this admission  - Hold Lantus/ ISS while NPO  - Q6 FS while npo. +hypoglycemia protocol      Prophylactic measures:  - dvt ppx: IMPROVE VTE 1(>59 y/o) low risk for DVT. No AC due to Hematuria.     Dispo: LOS likely 1-2 days pending resolution of hematuria.     - Patient requested his spouse (Anum Farley [phone]).  - Spouse contacted on 06/19. All questions answered. 71 y/o male with history of DM-2, HTN, HLD and BPH, who presented to ED with CC of suprapubic pain, hematuria, and straining worsening for 1 day. At ED patient was found to be on urinary retention, bustamante placed for decompression and CBI. Hyperthropic Prostate on physical exam. CT abdomen showed a 4- 5 cm hyperdense area in the bladder likely a clot but can't r/o a mass. No hydronephrosis on CT. Urology Following, bustamante to be in place with f/u as outpatient for TOV. Regarding CT findings, Urology considers it is likely a clot and will need cytoscopy as Outpt. Also noticed on admission Hypertensive urgency, now resolved with BP at goal.     Ileus of unclear etiology Constipation Vs Urinary retention/ Bladder distension  - S/p NGT with 100cc of bilious fluid drainage. NGT was changed from [place] to [place] by Sx, CXR confirmed position of the in the GI tract.   - No electrolyte abnormalities no peritoneal irritation signs.   - KUB pre NGT: gastric outlet obstruction  - NPO - IVF: Plamaslyte 100cc/h   - Zofran PRN nausea/ vomiting.   - Ambulation encouraged.  - If no improvement, will proceed with CT  - Surgery following, recs noted and appreciated.    Acute Urinary retention complicated with hematuria  - Hematuria resolved.    in the setting of an abnormal hyperdensity in the bladder (mass vs clot)   - Likely 2/2 BPH, s/p bustamante.  - Bustamante removed on 06/19 by urology, bladder scan 6 hours after removal or after first episode of voiding  - C/w Flomax  - Urology eval/ recs noted and appreciated  -OP cysto findings     Hypophosphatemia - resolved.     Hypertensive Urgency- resolved.   - Noted on admission.  - c/w  Norvasc - C/w enalapril   - Hydralazine 10 q6 PRN sbp >170  - BP goal considering h/o T2DM is <140/90  - C/w low DASH/ TLC diet    T2DM  a1c 8.7  - On Metformin and glimepiride as outpt. Will hold during this admission  - Hold Lantus/ ISS while NPO  - Q6 FS while npo. +hypoglycemia protocol      Prophylactic measures:  - dvt ppx: IMPROVE VTE 1(>59 y/o) low risk for DVT. No AC due to Hematuria.     Dispo: LOS likely 1-2 days pending resolution of hematuria.     - Patient requested his spouse (Anum Farley [phone]).  - Spouse contacted on 06/19. All questions answered. 71 y/o male with history of DM-2, HTN, HLD and BPH, who presented to ED with CC of suprapubic pain, hematuria, and straining worsening for 1 day. At ED patient was found to be on urinary retention, bustamante placed for decompression and CBI. Hyperthropic Prostate on physical exam. CT abdomen showed a 4- 5 cm hyperdense area in the bladder likely a clot but can't r/o a mass. No hydronephrosis on CT. Urology Following, bustamante to be in place with f/u as outpatient for TOV. Regarding CT findings, Urology considers it is likely a clot and will need cytoscopy as Outpt. Also noticed on admission Hypertensive urgency, now resolved with BP at goal.     Ileus of unclear etiology Constipation Vs Urinary retention/ Bladder distension  - S/p NGT with 100cc of bilious fluid drainage. NGT was changed from [place] to [place] by Sx, CXR confirmed position of the in the GI tract.   - No electrolyte abnormalities no peritoneal irritation signs.   - KUB pre NGT: gastric outlet obstruction  - NPO - IVF: Plamaslyte 100cc/h   - Zofran PRN nausea/ vomiting.   - Ambulation encouraged.  - If no improvement, will proceed with CT  - Surgery following, recs noted and appreciated.    Acute Urinary retention complicated with hematuria  - Hematuria resolved.    in the setting of an abnormal hyperdensity in the bladder (mass vs clot)   - Likely 2/2 BPH, s/p bustamante.  - Bustamante removed on 06/19 by urology, bladder scan 6 hours after removal or after first episode of voiding  - Urine culture +Morganella morganii, <50.000 colonies. Will not treat.   - C/w Flomax  - Urology eval/ recs noted and appreciated  -OP cysto findings     Hypophosphatemia - resolved.     Hypertensive Urgency- resolved.   - Noted on admission.  - c/w  Norvasc - C/w enalapril   - Hydralazine 10 q6 PRN sbp >170  - BP goal considering h/o T2DM is <140/90  - C/w low DASH/ TLC diet    T2DM  a1c 8.7  - On Metformin and glimepiride as outpt. Will hold during this admission  - Hold Lantus/ ISS while NPO  - Q6 FS while npo. +hypoglycemia protocol      Prophylactic measures:  - dvt ppx: IMPROVE VTE 1(>59 y/o) low risk for DVT. No AC due to Hematuria.     Dispo: LOS likely 1-2 days pending resolution of hematuria.     - Patient requested his spouse (Anum Farley [phone]).  - Spouse contacted on 06/19. All questions answered. 69 y/o male with history of DM-2, HTN, HLD and BPH, who presented to ED with CC of suprapubic pain, hematuria, and straining worsening for 1 day. At ED patient was found to be on urinary retention, bustamante placed for decompression and CBI. Hyperthropic Prostate on physical exam. CT abdomen showed a 4- 5 cm hyperdense area in the bladder likely a clot but can't r/o a mass. No hydronephrosis on CT. Urology Following, d/c'd bustamante on 6/19 after urine cleared. Regarding CT findings, Urology considers it is likely a clot and will need cytoscopy as Outpt. Also noticed on admission Hypertensive urgency, now resolved with BP at goal. During the hospital stay, started to have nausea/vomiting and found to have an obstruction (pending official xray read). NPO started with IVF and ngt placed. Surgery called to follow.     Ileus of unclear etiology Constipation Vs Urinary retention/ Bladder distension  +leucocytosis which is likely reactive   - S/p NGT with 100cc of bilious fluid drainage. NGT was changed from [place] to [place] by Sx, CXR confirmed position of the in the GI tract.   - No electrolyte abnormalities no peritoneal irritation signs.   - KUB pre NGT: gastric outlet obstruction  - NPO - IVF: Plamaslyte 100cc/h   - Zofran PRN nausea/ vomiting.   - Ambulation encouraged.  - If no improvement, will proceed with CT in am   - Surgery following, recs noted and appreciated.    Acute Urinary retention complicated with hematuria  - Hematuria resolved.    in the setting of an abnormal hyperdensity in the bladder (mass vs clot)   - Likely 2/2 BPH, s/p bustamante.  - Bustamante removed on 06/19 by urology, bladder scan 6 hours after removal or after first episode of voiding  - Urine culture +Morganella morganii, <50.000 colonies. Will not treat at this time    - C/w Flomax  - Urology eval/ recs noted and appreciated  -OP cysto findings     Hypophosphatemia - resolved.     Hypertensive Urgency- resolved.   - Noted on admission.  - c/w  Norvasc - C/w enalapril   - Hydralazine 10 q6 PRN sbp >170  - BP goal considering h/o T2DM is <140/90  - C/w low DASH/ TLC diet    T2DM  a1c 8.7  - On Metformin and glimepiride as outpt. Will hold during this admission  - Hold Lantus while NPO and then add sliding scale with scale to start over 200   - Q6 FS while npo. +hypoglycemia protocol    Prophylactic measures:  - dvt ppx: IMPROVE VTE 1(>61 y/o) low risk for DVT. No AC due to Hematuria.     Dispo: LOS likely 1-2 days pending resolution of hematuria.     - Patient requested his spouse (Anum Farley [phone]).  - Spouse contacted on 06/19. All questions answered. 71 y/o male with history of DM-2, HTN, HLD and BPH, who presented to ED with CC of suprapubic pain, hematuria, and straining worsening for 1 day. At ED patient was found to be on urinary retention, bustamante placed for decompression and CBI. Hyperthropic Prostate on physical exam. CT abdomen showed a 4- 5 cm hyperdense area in the bladder likely a clot but can't r/o a mass. No hydronephrosis on CT. Urology Following, d/c'd bustamante on 6/19 after urine cleared. Regarding CT findings, Urology considers it is likely a clot and will need cytoscopy as Outpt. Also noticed on admission Hypertensive urgency, now resolved with BP at goal. During the hospital stay, started to have nausea/vomiting and found to have an obstruction (pending official xray read). NPO started with IVF and ngt placed. Surgery called to follow.     Ileus of unclear etiology Constipation Vs Urinary retention/ Bladder distension  +leucocytosis which is likely reactive   - S/p NGT with 100cc of bilious fluid drainage. NGT was changed from [place] to [place] by Sx, CXR confirmed position of the in the GI tract.   - No electrolyte abnormalities no peritoneal irritation signs.   - KUB pre NGT: gastric outlet obstruction  - NPO - IVF: Plamaslyte 100cc/h   - Zofran PRN nausea/ vomiting.   - Ambulation encouraged.  - If no improvement, will proceed with CT in am   - Surgery following, recs noted and appreciated.    Acute Urinary retention complicated with hematuria  - Hematuria resolved.    in the setting of an abnormal hyperdensity in the bladder (mass vs clot)   - Likely 2/2 BPH, s/p bustamante.  - Bustamante removed on 06/19 by urology, s/p voided with 40cc post void residual   - Urine culture +Morganella morganii, <50.000 colonies. Will not treat at this time    - C/w Flomax  - Urology eval/ recs noted and appreciated  -OP cysto findings     Hypophosphatemia - resolved.     Hypertensive Urgency- resolved.   - Noted on admission.  - c/w  Norvasc - C/w enalapril   - Hydralazine 10 q6 PRN sbp >170  - BP goal considering h/o T2DM is <140/90  - C/w low DASH/ TLC diet    T2DM  a1c 8.7  - On Metformin and glimepiride as outpt. Will hold during this admission  - Hold Lantus while NPO and then add sliding scale with scale to start over 200   - Q6 FS while npo. +hypoglycemia protocol    Prophylactic measures:  - dvt ppx: IMPROVE VTE 1(>59 y/o) low risk for DVT. No AC due to Hematuria.     Dispo: LOS likely 1-2 days pending resolution of hematuria.     - Patient requested his spouse (Anum Farley [phone]).  - Spouse contacted on 06/19. All questions answered. 71 y/o male with history of DM-2, HTN, HLD and BPH, who presented to ED with CC of suprapubic pain, hematuria, and straining worsening for 1 day. At ED patient was found to be on urinary retention, bustamante placed for decompression and CBI. Hyperthropic Prostate on physical exam. CT abdomen showed a 4- 5 cm hyperdense area in the bladder likely a clot but can't r/o a mass. No hydronephrosis on CT. Urology Following, d/c'd bustamante on 6/19 after urine cleared. Regarding CT findings, Urology considers it is likely a clot and will need cytoscopy as Outpt. Also noticed on admission Hypertensive urgency, now resolved with BP at goal. During the hospital stay, started to have nausea/vomiting and found to have an obstruction (pending official xray read). NPO started with IVF and ngt placed. Surgery called to follow.     Ileus of unclear etiology Constipation Vs Urinary retention/ Bladder distension  +leucocytosis which is likely reactive   - S/p NGT with 100cc of bilious fluid drainage. NGT was changed from [place] to [place] by Sx, CXR confirmed position of the in the GI tract.   - No electrolyte abnormalities no peritoneal irritation signs.   - KUB pre NGT: gastric outlet obstruction  - NPO - IVF: Plamaslyte 100cc/h   - Zofran PRN nausea/ vomiting.   - Ambulation encouraged.  - If no improvement, will proceed with CT in am   - Surgery following, recs noted and appreciated.    Prerenal azotemia -  likely decreased oral intake/+ nausea/vomiting   -c/w hydration     Acute Urinary retention complicated with hematuria  - Hematuria resolved.    in the setting of an abnormal hyperdensity in the bladder (mass vs clot)   - Likely 2/2 BPH, s/p bustamante.  - Bustamante removed on 06/19 by urology, s/p voided with 40cc post void residual   - Urine culture +Morganella morganii, <50.000 colonies. Will not treat at this time    - C/w Flomax  - Urology eval/ recs noted and appreciated  -OP cysto findings     Hypophosphatemia - resolved.     Hypertensive Urgency- resolved.   - Noted on admission.  - c/w  Norvasc - C/w enalapril   - Hydralazine 10 q6 PRN sbp >170  - BP goal considering h/o T2DM is <140/90  - C/w low DASH/ TLC diet    T2DM  a1c 8.7  - On Metformin and glimepiride as outpt. Will hold during this admission  - Hold Lantus while NPO and then add sliding scale with scale to start over 200   - Q6 FS while npo. +hypoglycemia protocol    Prophylactic measures:  - dvt ppx: IMPROVE VTE 1(>59 y/o) low risk for DVT. No AC due to Hematuria.     Dispo: LOS likely 1-2 days pending resolution of hematuria.     - Patient requested his spouse (Anum Farley [phone]).  - Spouse contacted on 06/19. All questions answered.

## 2020-06-19 NOTE — PROGRESS NOTE ADULT - ASSESSMENT
69 yo male with hematuria, resolved, BPH, bladder spasms  - d/c bustamante cath today  - OOB, ambulate  - bladder scan 6hrs after bustamante removed or after 1st void and record amount

## 2020-06-19 NOTE — PROGRESS NOTE ADULT - GENITOURINARY COMMENTS
bustamante in place, draining well, no clots
phimosis, bustamante in place, not draining, small amount of urine draining around catheter

## 2020-06-20 LAB
ALBUMIN SERPL ELPH-MCNC: 3.7 G/DL — SIGNIFICANT CHANGE UP (ref 3.3–5.2)
ALP SERPL-CCNC: 83 U/L — SIGNIFICANT CHANGE UP (ref 40–120)
ALT FLD-CCNC: 20 U/L — SIGNIFICANT CHANGE UP
ANION GAP SERPL CALC-SCNC: 15 MMOL/L — SIGNIFICANT CHANGE UP (ref 5–17)
AST SERPL-CCNC: 24 U/L — SIGNIFICANT CHANGE UP
BASOPHILS # BLD AUTO: 0.02 K/UL — SIGNIFICANT CHANGE UP (ref 0–0.2)
BASOPHILS NFR BLD AUTO: 0.2 % — SIGNIFICANT CHANGE UP (ref 0–2)
BILIRUB SERPL-MCNC: 0.8 MG/DL — SIGNIFICANT CHANGE UP (ref 0.4–2)
BUN SERPL-MCNC: 27 MG/DL — HIGH (ref 8–20)
CALCIUM SERPL-MCNC: 9 MG/DL — SIGNIFICANT CHANGE UP (ref 8.6–10.2)
CHLORIDE SERPL-SCNC: 93 MMOL/L — LOW (ref 98–107)
CO2 SERPL-SCNC: 29 MMOL/L — SIGNIFICANT CHANGE UP (ref 22–29)
CREAT SERPL-MCNC: 0.67 MG/DL — SIGNIFICANT CHANGE UP (ref 0.5–1.3)
EOSINOPHIL # BLD AUTO: 0.12 K/UL — SIGNIFICANT CHANGE UP (ref 0–0.5)
EOSINOPHIL NFR BLD AUTO: 1.2 % — SIGNIFICANT CHANGE UP (ref 0–6)
GLUCOSE BLDC GLUCOMTR-MCNC: 137 MG/DL — HIGH (ref 70–99)
GLUCOSE BLDC GLUCOMTR-MCNC: 144 MG/DL — HIGH (ref 70–99)
GLUCOSE BLDC GLUCOMTR-MCNC: 147 MG/DL — HIGH (ref 70–99)
GLUCOSE BLDC GLUCOMTR-MCNC: 149 MG/DL — HIGH (ref 70–99)
GLUCOSE BLDC GLUCOMTR-MCNC: 156 MG/DL — HIGH (ref 70–99)
GLUCOSE BLDC GLUCOMTR-MCNC: 161 MG/DL — HIGH (ref 70–99)
GLUCOSE BLDC GLUCOMTR-MCNC: 164 MG/DL — HIGH (ref 70–99)
GLUCOSE SERPL-MCNC: 162 MG/DL — HIGH (ref 70–99)
HCT VFR BLD CALC: 45.4 % — SIGNIFICANT CHANGE UP (ref 39–50)
HGB BLD-MCNC: 15.3 G/DL — SIGNIFICANT CHANGE UP (ref 13–17)
IMM GRANULOCYTES NFR BLD AUTO: 0.3 % — SIGNIFICANT CHANGE UP (ref 0–1.5)
LYMPHOCYTES # BLD AUTO: 2.3 K/UL — SIGNIFICANT CHANGE UP (ref 1–3.3)
LYMPHOCYTES # BLD AUTO: 22.3 % — SIGNIFICANT CHANGE UP (ref 13–44)
MAGNESIUM SERPL-MCNC: 2.4 MG/DL — SIGNIFICANT CHANGE UP (ref 1.6–2.6)
MCHC RBC-ENTMCNC: 30 PG — SIGNIFICANT CHANGE UP (ref 27–34)
MCHC RBC-ENTMCNC: 33.7 GM/DL — SIGNIFICANT CHANGE UP (ref 32–36)
MCV RBC AUTO: 89 FL — SIGNIFICANT CHANGE UP (ref 80–100)
MONOCYTES # BLD AUTO: 0.73 K/UL — SIGNIFICANT CHANGE UP (ref 0–0.9)
MONOCYTES NFR BLD AUTO: 7.1 % — SIGNIFICANT CHANGE UP (ref 2–14)
NEUTROPHILS # BLD AUTO: 7.13 K/UL — SIGNIFICANT CHANGE UP (ref 1.8–7.4)
NEUTROPHILS NFR BLD AUTO: 68.9 % — SIGNIFICANT CHANGE UP (ref 43–77)
PHOSPHATE SERPL-MCNC: 3.2 MG/DL — SIGNIFICANT CHANGE UP (ref 2.4–4.7)
PLATELET # BLD AUTO: 212 K/UL — SIGNIFICANT CHANGE UP (ref 150–400)
POTASSIUM SERPL-MCNC: 3.4 MMOL/L — LOW (ref 3.5–5.3)
POTASSIUM SERPL-SCNC: 3.4 MMOL/L — LOW (ref 3.5–5.3)
PROT SERPL-MCNC: 6.8 G/DL — SIGNIFICANT CHANGE UP (ref 6.6–8.7)
RBC # BLD: 5.1 M/UL — SIGNIFICANT CHANGE UP (ref 4.2–5.8)
RBC # FLD: 12.2 % — SIGNIFICANT CHANGE UP (ref 10.3–14.5)
SODIUM SERPL-SCNC: 137 MMOL/L — SIGNIFICANT CHANGE UP (ref 135–145)
WBC # BLD: 10.33 K/UL — SIGNIFICANT CHANGE UP (ref 3.8–10.5)
WBC # FLD AUTO: 10.33 K/UL — SIGNIFICANT CHANGE UP (ref 3.8–10.5)

## 2020-06-20 PROCEDURE — 99233 SBSQ HOSP IP/OBS HIGH 50: CPT | Mod: GC

## 2020-06-20 PROCEDURE — 74018 RADEX ABDOMEN 1 VIEW: CPT | Mod: 26

## 2020-06-20 PROCEDURE — 99232 SBSQ HOSP IP/OBS MODERATE 35: CPT

## 2020-06-20 RX ORDER — ENOXAPARIN SODIUM 100 MG/ML
40 INJECTION SUBCUTANEOUS AT BEDTIME
Refills: 0 | Status: DISCONTINUED | OUTPATIENT
Start: 2020-06-20 | End: 2020-06-20

## 2020-06-20 RX ORDER — INSULIN LISPRO 100/ML
VIAL (ML) SUBCUTANEOUS
Refills: 0 | Status: DISCONTINUED | OUTPATIENT
Start: 2020-06-20 | End: 2020-06-22

## 2020-06-20 RX ORDER — SODIUM CHLORIDE 9 MG/ML
1000 INJECTION, SOLUTION INTRAVENOUS
Refills: 0 | Status: DISCONTINUED | OUTPATIENT
Start: 2020-06-20 | End: 2020-06-20

## 2020-06-20 RX ORDER — SODIUM CHLORIDE 9 MG/ML
1000 INJECTION INTRAMUSCULAR; INTRAVENOUS; SUBCUTANEOUS ONCE
Refills: 0 | Status: COMPLETED | OUTPATIENT
Start: 2020-06-20 | End: 2020-06-20

## 2020-06-20 RX ORDER — POTASSIUM CHLORIDE 20 MEQ
10 PACKET (EA) ORAL
Refills: 0 | Status: COMPLETED | OUTPATIENT
Start: 2020-06-20 | End: 2020-06-20

## 2020-06-20 RX ORDER — DIATRIZOATE MEGLUMINE 180 MG/ML
90 INJECTION, SOLUTION INTRAVESICAL ONCE
Refills: 0 | Status: COMPLETED | OUTPATIENT
Start: 2020-06-20 | End: 2020-06-20

## 2020-06-20 RX ORDER — INSULIN GLARGINE 100 [IU]/ML
12 INJECTION, SOLUTION SUBCUTANEOUS AT BEDTIME
Refills: 0 | Status: DISCONTINUED | OUTPATIENT
Start: 2020-06-20 | End: 2020-06-22

## 2020-06-20 RX ADMIN — SODIUM CHLORIDE 3 MILLILITER(S): 9 INJECTION INTRAMUSCULAR; INTRAVENOUS; SUBCUTANEOUS at 16:03

## 2020-06-20 RX ADMIN — DIATRIZOATE MEGLUMINE 90 MILLILITER(S): 180 INJECTION, SOLUTION INTRAVESICAL at 08:55

## 2020-06-20 RX ADMIN — SODIUM CHLORIDE 130 MILLILITER(S): 9 INJECTION, SOLUTION INTRAVENOUS at 12:32

## 2020-06-20 RX ADMIN — ONDANSETRON 4 MILLIGRAM(S): 8 TABLET, FILM COATED ORAL at 00:14

## 2020-06-20 RX ADMIN — Medication 100 MILLIEQUIVALENT(S): at 13:33

## 2020-06-20 RX ADMIN — SODIUM CHLORIDE 1000 MILLILITER(S): 9 INJECTION INTRAMUSCULAR; INTRAVENOUS; SUBCUTANEOUS at 09:56

## 2020-06-20 RX ADMIN — TAMSULOSIN HYDROCHLORIDE 0.8 MILLIGRAM(S): 0.4 CAPSULE ORAL at 22:17

## 2020-06-20 RX ADMIN — Medication 100 MILLIEQUIVALENT(S): at 12:31

## 2020-06-20 RX ADMIN — SODIUM CHLORIDE 3 MILLILITER(S): 9 INJECTION INTRAMUSCULAR; INTRAVENOUS; SUBCUTANEOUS at 22:14

## 2020-06-20 RX ADMIN — Medication 2: at 22:13

## 2020-06-20 RX ADMIN — Medication 100 MILLIEQUIVALENT(S): at 18:22

## 2020-06-20 RX ADMIN — INSULIN GLARGINE 12 UNIT(S): 100 INJECTION, SOLUTION SUBCUTANEOUS at 22:18

## 2020-06-20 RX ADMIN — SODIUM CHLORIDE 3 MILLILITER(S): 9 INJECTION INTRAMUSCULAR; INTRAVENOUS; SUBCUTANEOUS at 05:47

## 2020-06-20 NOTE — PROGRESS NOTE ADULT - ASSESSMENT
70 year old male presents with acute onset abdominal distension and no bowel function   -f/u NGT out  -pain control; avoid narcotics  -encourage ambulation  -possible gastrograffin challenge in AM  -dvt ppx 70 year old male presents with acute onset abdominal distension and no bowel function   -f/u NGT out  -pain control; avoid narcotics  -encourage ambulation  -recommend  Gastrograffin challenge this AM  -dvt ppx

## 2020-06-20 NOTE — PROGRESS NOTE ADULT - SUBJECTIVE AND OBJECTIVE BOX
70y Male,   Patient is a 70y old  Male who presents with a chief complaint of Gross Hematuria  Bladder Mass (20 Jun 2020 00:44)    INTERVAL/OVERNIGHT EVENTS:    Patient examined at beside. No acute events over night.  NGT drained ~600cc during the night as per RN sign out. Patient remained afebrile.  Patient states feeling better compared to previous day but still not at baseline. States he has been passing gas during the night and had 1 bowel movement after Gastrografin was given, denies melena/hematochezia. No further episodes of vomiting.  Patient denies chest pain, calf pain, abdominal pain, headaches, fever, chills, dysuria, hematuria, diarrhea, constipation, SOB, palpitations.   Rest of ROS not contributory except for above.      I&O's Summary    19 Jun 2020 07:01  -  20 Jun 2020 07:00  --------------------------------------------------------  IN: 4850 mL / OUT: 5000 mL / NET: -150 mL    20 Jun 2020 07:01  -  20 Jun 2020 13:57  --------------------------------------------------------  IN: 0 mL / OUT: 475 mL / NET: -475 mL        CAPILLARY BLOOD GLUCOSE  POCT Blood Glucose.: 137 mg/dL (20 Jun 2020 12:36)  POCT Blood Glucose.: 149 mg/dL (20 Jun 2020 08:37)  POCT Blood Glucose.: 144 mg/dL (20 Jun 2020 07:39)  POCT Blood Glucose.: 164 mg/dL (20 Jun 2020 05:49)  POCT Blood Glucose.: 161 mg/dL (20 Jun 2020 01:18)  POCT Blood Glucose.: 117 mg/dL (19 Jun 2020 22:03)  POCT Blood Glucose.: 151 mg/dL (19 Jun 2020 18:20)  POCT Blood Glucose.: 168 mg/dL (19 Jun 2020 13:58)      T(C): 36.4 (06-20-20 @ 07:48), Max: 37 (06-19-20 @ 16:22)  HR: 83 (06-20-20 @ 07:48) (83 - 93)  BP: 117/75 (06-20-20 @ 07:48) (117/75 - 144/88)  RR: 18 (06-20-20 @ 07:48) (18 - 19)  SpO2: 95% (06-20-20 @ 00:00) (93% - 95%)      PHYSICAL EXAM:  GENERAL: AA&O x4. Not in acute distress.  HEENT: Dry oral mucosa.  clear sclera b/l.   CARDIOVASCULAR: RRR, S1/S2+. No edema.  RESPIRATORY: Lungs are clear to auscultation B/l  GASTROINTESTINAL: Abdomen is soft, non tended, mildly distended. Bowel sounds present on 4 quadrants, decreased on left lower quadrant.   EXTREMITIES: No clubbing, cyanosis or edema. No calf tenderness. Distal pulses wnl.   MUSCULOSKELETAL: 5/5 muscle strength in UE and LE.   SKIN: warm and dry with normal turgor.        LABS                          15.3   10.33 )-----------( 212      ( 20 Jun 2020 07:54 )             45.4         06-20    137  |  93<L>  |  27.0<H>  ----------------------------<  162<H>  3.4<L>   |  29.0  |  0.67    Ca    9.0      20 Jun 2020 07:54  Phos  3.2     06-20  Mg     2.4     06-20    TPro  6.8  /  Alb  3.7  /  TBili  0.8  /  DBili  x   /  AST  24  /  ALT  20  /  AlkPhos  83  06-20    LIVER FUNCTIONS - ( 20 Jun 2020 07:54 )  Alb: 3.7 g/dL / Pro: 6.8 g/dL / ALK PHOS: 83 U/L / ALT: 20 U/L / AST: 24 U/L / GGT: x             MEDICATIONS  (STANDING):  amLODIPine   Tablet 10 milliGRAM(s) Oral daily  dextrose 5%. 1000 milliLiter(s) (50 mL/Hr) IV Continuous <Continuous>  dextrose 50% Injectable 12.5 Gram(s) IV Push once  dextrose 50% Injectable 25 Gram(s) IV Push once  dextrose 50% Injectable 25 Gram(s) IV Push once  enalapril 20 milliGRAM(s) Oral daily  finasteride 5 milliGRAM(s) Oral daily  insulin lispro (HumaLOG) corrective regimen sliding scale   SubCutaneous at bedtime  insulin lispro (HumaLOG) corrective regimen sliding scale   SubCutaneous every 6 hours  multiple electrolytes Injection Type 1 1000 milliLiter(s) (130 mL/Hr) IV Continuous <Continuous>  potassium chloride  10 mEq/100 mL IVPB 10 milliEquivalent(s) IV Intermittent every 1 hour  sodium chloride 0.9% lock flush 3 milliLiter(s) IV Push every 8 hours  tamsulosin 0.8 milliGRAM(s) Oral at bedtime    MEDICATIONS  (PRN):  dextrose 40% Gel 15 Gram(s) Oral once PRN Blood Glucose LESS THAN 70 milliGRAM(s)/deciliter  glucagon  Injectable 1 milliGRAM(s) IntraMuscular once PRN Glucose LESS THAN 70 milligrams/deciliter  hydrALAZINE Injectable 10 milliGRAM(s) IV Push every 6 hours PRN SBP >170  ondansetron Injectable 4 milliGRAM(s) IV Push every 6 hours PRN Nausea

## 2020-06-20 NOTE — PROGRESS NOTE ADULT - SUBJECTIVE AND OBJECTIVE BOX
no acute events overnight. managed with nasogastric tube. abdomen non tender  no bowel movement.     MEDICATIONS  (STANDING):  amLODIPine   Tablet 10 milliGRAM(s) Oral daily  dextrose 5%. 1000 milliLiter(s) (50 mL/Hr) IV Continuous <Continuous>  dextrose 50% Injectable 12.5 Gram(s) IV Push once  dextrose 50% Injectable 25 Gram(s) IV Push once  dextrose 50% Injectable 25 Gram(s) IV Push once  enalapril 20 milliGRAM(s) Oral daily  finasteride 5 milliGRAM(s) Oral daily  insulin lispro (HumaLOG) corrective regimen sliding scale   SubCutaneous at bedtime  insulin lispro (HumaLOG) corrective regimen sliding scale   SubCutaneous every 6 hours  multiple electrolytes Injection Type 1 1000 milliLiter(s) (100 mL/Hr) IV Continuous <Continuous>  sodium chloride 0.9% lock flush 3 milliLiter(s) IV Push every 8 hours  tamsulosin 0.8 milliGRAM(s) Oral at bedtime    MEDICATIONS  (PRN):  dextrose 40% Gel 15 Gram(s) Oral once PRN Blood Glucose LESS THAN 70 milliGRAM(s)/deciliter  glucagon  Injectable 1 milliGRAM(s) IntraMuscular once PRN Glucose LESS THAN 70 milligrams/deciliter  hydrALAZINE Injectable 10 milliGRAM(s) IV Push every 6 hours PRN SBP >170  ondansetron Injectable 4 milliGRAM(s) IV Push every 6 hours PRN Nausea      Vital Signs Last 24 Hrs  T(C): 36.7 (20 Jun 2020 00:00), Max: 37 (19 Jun 2020 16:22)  T(F): 98.1 (20 Jun 2020 00:00), Max: 98.6 (19 Jun 2020 16:22)  HR: 92 (20 Jun 2020 00:00) (81 - 93)  BP: 128/87 (20 Jun 2020 00:00) (125/82 - 166/98)  BP(mean): --  RR: 19 (20 Jun 2020 00:00) (18 - 19)  SpO2: 95% (20 Jun 2020 00:00) (93% - 95%)    Physical Exam:    general: no acute distress, AOx3  Respiratory: Breath Sounds equal & clear to auscultation, no accessory muscle use  Cardiovascular: Regular rate & rhythm, normal S1, S2; no murmurs, gallops or rubs  Gastrointestinal: Soft, non-tender, nondistended, negative ya's sign    Extremities: No peripheral edema, No cyanosis, clubbing     Vascular: Equal and normal pulses: 2+ peripheral pulses throughout    Musculoskeletal: No joint pain, swelling or deformity; no limitation of movement    Skin: No rashes      I&O's Detail    18 Jun 2020 07:01  -  19 Jun 2020 07:00  --------------------------------------------------------  IN:    Continuous Bladder Irrigation: 6800 mL    IV PiggyBack: 250 mL  Total IN: 7050 mL    OUT:    Continuous Bladder Irrigation: 6750 mL    Voided: 2050 mL  Total OUT: 8800 mL    Total NET: -1750 mL      19 Jun 2020 07:01  -  20 Jun 2020 00:45  --------------------------------------------------------  IN:    Continuous Bladder Irrigation: 4550 mL    multiple electrolytes Injection Type 1: 300 mL  Total IN: 4850 mL    OUT:    Continuous Bladder Irrigation: 4500 mL    Voided: 200 mL  Total OUT: 4700 mL    Total NET: 150 mL          LABS:                        15.8   13.18 )-----------( 221      ( 19 Jun 2020 10:10 )             47.3     06-19    135  |  94<L>  |  23.0<H>  ----------------------------<  183<H>  4.0   |  28.0  |  0.75    Ca    9.3      19 Jun 2020 10:10  Phos  3.8     06-19  Mg     2.3     06-19            RADIOLOGY & ADDITIONAL STUDIES:

## 2020-06-20 NOTE — PROGRESS NOTE ADULT - ASSESSMENT
69 y/o male with history of DM-2, HTN, HLD and BPH, who presented to ED with CC of suprapubic pain, hematuria, and straining worsening for 1 day. At ED patient was found to be on urinary retention, bustamante placed for decompression and CBI. Hyperthropic Prostate on physical exam. CT abdomen showed a 4- 5 cm hyperdense area in the bladder likely a clot but can't r/o a mass. No hydronephrosis on CT. Urology Following, d/c'd bustamante on 6/19 after urine cleared. Regarding CT findings, Urology considers it is likely a clot and will need cytoscopy as Outpt. Also noticed on admission Hypertensive urgency, now resolved with BP at goal. During the hospital stay, started to have nausea/vomiting and found to have an obstruction (pending official xray read). NPO started with IVF and ngt placed. Surgery called to follow.     Ileus of unclear etiology Constipation Vs Urinary retention/ Bladder distension  +leucocytosis likely reactive, now resolved   - S/p NGT, actively draining.   - Mild hypokalemia today, likely 2/2 vomiting on 06/19. Replace with K-riders x3.   - KUB pre NGT: gastric outlet obstruction  - NPO - IVF: NS 1L bolus x1 Maintenance D5+ cc/hr x 1 day. Fingersticks, stable now.   - Zofran PRN nausea/ vomiting.   - Ambulation encouraged.  - If no improvement, will proceed with CT in am   - Surgery following, recs noted and appreciated.    Prerenal azotemia -  likely decreased oral intake/+ nausea/vomiting   -c/w hydration     Acute Urinary retention complicated with hematuria  - Hematuria resolved.    in the setting of an abnormal hyperdensity in the bladder (mass vs clot)   - Likely 2/2 BPH, s/p bustamante.  - Bustamante removed on 06/19 by urology, patient voiding frequently.   - Urine culture +Morganella morganii, <50.000 colonies. Will not treat at this time    - C/w Flomax  - Urology eval/ recs noted and appreciated  -OP cysto findings     Hypophosphatemia - resolved.     Hypertensive Urgency- resolved.   - Noted on admission.  - c/w  Norvasc - C/w enalapril   - Hydralazine 10 q6 PRN sbp >170  - BP goal considering h/o T2DM is <140/90  - C/w low DASH/ TLC diet    T2DM  a1c 8.7  - On Metformin and glimepiride as outpt. Will hold during this admission  - Hold Lantus while NPO and then add sliding scale with scale to start over 200   - Q6 FS while npo. +hypoglycemia protocol    Prophylactic measures:  - dvt ppx: IMPROVE VTE 1(>61 y/o) low risk for DVT. No AC due to Hematuria.     Dispo: LOS likely 1-2 days pending resolution of hematuria.     - Patient requested his spouse (Anum Farley [phone]).  - Spouse contacted on 06/19. All questions answered. 71 y/o male with history of DM-2, HTN, HLD and BPH, who presented to ED with CC of suprapubic pain, hematuria, and straining worsening for 1 day. At ED patient was found to be on urinary retention, bustamante placed for decompression and CBI. Hyperthropic Prostate on physical exam. CT abdomen showed a 4- 5 cm hyperdense area in the bladder likely a clot but can't r/o a mass. No hydronephrosis on CT. Urology Following, d/c'd bustamante on 6/19 after urine cleared. Regarding CT findings, Urology considers it is likely a clot and will need cytoscopy as Outpt. Also noticed on admission Hypertensive urgency, now resolved with BP at goal. During the hospital stay, started to have nausea/vomiting and found to have an obstruction (pending official xray read). NPO started with IVF and ngt placed. Surgery called to follow.     Ileus of unclear etiology Constipation Vs Urinary retention/ Bladder distension  +leucocytosis likely reactive, now resolved   - S/p NGT, actively draining.   - Mild hypokalemia today, likely 2/2 vomiting on 06/19. Replace with K-riders x3.   - KUB pre NGT: gastric outlet obstruction  - NPO - IVF: NS 1L bolus x1 Maintenance D5+ cc/hr x 1 day. Fingersticks, stable now.   - Zofran PRN nausea/ vomiting.   - Ambulation encouraged.  - If no improvement, will proceed with CT in am   - Surgery following, recs noted and appreciated.    Prerenal azotemia -  likely decreased oral intake/+ nausea/vomiting   -c/w hydration     Acute Urinary retention complicated with hematuria  - Hematuria resolved.    in the setting of an abnormal hyperdensity in the bladder (mass vs clot)   - Likely 2/2 BPH, s/p bustamante.  - Bustamante removed on 06/19 by urology, patient voiding frequently.   - Urine culture +Morganella morganii, <50.000 colonies. Will not treat at this time    - C/w Flomax  - Urology eval/ recs noted and appreciated  -OP cysto findings     Hypophosphatemia - resolved.     Hypertensive Urgency- resolved.   - Noted on admission.  - c/w  Norvasc - C/w enalapril   - Hydralazine 10 q6 PRN sbp >170  - BP goal considering h/o T2DM is <140/90  - C/w low DASH/ TLC diet    T2DM  a1c 8.7  - On Metformin and glimepiride as outpt. Will hold during this admission  - Hold Lantus while NPO and then add sliding scale with scale to start over 200   - Q6 FS while npo. +hypoglycemia protocol    Prophylactic measures:  - dvt ppx: IMPROVE VTE 1(>61 y/o) low risk for DVT. No AC due to Hematuria.     Dispo: LOS likely 1-2 days pending resolution of hematuria.     - Patient requested his spouse (Anum Farley [phone]).  - Spouse contacted on 06/20. All questions answered.

## 2020-06-20 NOTE — CHART NOTE - NSCHARTNOTEFT_GEN_A_CORE
Patient had bustamante out.  voiding frequently.  Has NG tube in place.  Gastrogaffin small bowel study in progress.      Continue finasteride and tamsulosin

## 2020-06-21 LAB
GLUCOSE BLDC GLUCOMTR-MCNC: 102 MG/DL — HIGH (ref 70–99)
GLUCOSE BLDC GLUCOMTR-MCNC: 125 MG/DL — HIGH (ref 70–99)
GLUCOSE BLDC GLUCOMTR-MCNC: 152 MG/DL — HIGH (ref 70–99)
GLUCOSE BLDC GLUCOMTR-MCNC: 212 MG/DL — HIGH (ref 70–99)

## 2020-06-21 PROCEDURE — 99232 SBSQ HOSP IP/OBS MODERATE 35: CPT | Mod: GC

## 2020-06-21 RX ADMIN — SODIUM CHLORIDE 3 MILLILITER(S): 9 INJECTION INTRAMUSCULAR; INTRAVENOUS; SUBCUTANEOUS at 22:12

## 2020-06-21 RX ADMIN — Medication 20 MILLIGRAM(S): at 05:10

## 2020-06-21 RX ADMIN — TAMSULOSIN HYDROCHLORIDE 0.8 MILLIGRAM(S): 0.4 CAPSULE ORAL at 22:11

## 2020-06-21 RX ADMIN — AMLODIPINE BESYLATE 10 MILLIGRAM(S): 2.5 TABLET ORAL at 05:09

## 2020-06-21 RX ADMIN — Medication 2: at 10:57

## 2020-06-21 RX ADMIN — SODIUM CHLORIDE 3 MILLILITER(S): 9 INJECTION INTRAMUSCULAR; INTRAVENOUS; SUBCUTANEOUS at 10:56

## 2020-06-21 RX ADMIN — Medication 4: at 22:11

## 2020-06-21 RX ADMIN — SODIUM CHLORIDE 3 MILLILITER(S): 9 INJECTION INTRAMUSCULAR; INTRAVENOUS; SUBCUTANEOUS at 05:08

## 2020-06-21 RX ADMIN — INSULIN GLARGINE 12 UNIT(S): 100 INJECTION, SOLUTION SUBCUTANEOUS at 22:11

## 2020-06-21 RX ADMIN — FINASTERIDE 5 MILLIGRAM(S): 5 TABLET, FILM COATED ORAL at 10:57

## 2020-06-21 NOTE — PROGRESS NOTE ADULT - SUBJECTIVE AND OBJECTIVE BOX
HPI/OVERNIGHT EVENTS:  Patient administered GG yesterday via NGT. 2x BM. NGT removed and advanced to CLD yesterday. Doing well, tolerating diet without nausea or vomiting. No acute complaints at this time.      MEDICATIONS  (STANDING):  amLODIPine   Tablet 10 milliGRAM(s) Oral daily  dextrose 5%. 1000 milliLiter(s) (50 mL/Hr) IV Continuous <Continuous>  dextrose 50% Injectable 12.5 Gram(s) IV Push once  dextrose 50% Injectable 25 Gram(s) IV Push once  dextrose 50% Injectable 25 Gram(s) IV Push once  enalapril 20 milliGRAM(s) Oral daily  finasteride 5 milliGRAM(s) Oral daily  insulin glargine Injectable (LANTUS) 12 Unit(s) SubCutaneous at bedtime  insulin lispro (HumaLOG) corrective regimen sliding scale   SubCutaneous Before meals and at bedtime  sodium chloride 0.9% lock flush 3 milliLiter(s) IV Push every 8 hours  tamsulosin 0.8 milliGRAM(s) Oral at bedtime    MEDICATIONS  (PRN):  dextrose 40% Gel 15 Gram(s) Oral once PRN Blood Glucose LESS THAN 70 milliGRAM(s)/deciliter  glucagon  Injectable 1 milliGRAM(s) IntraMuscular once PRN Glucose LESS THAN 70 milligrams/deciliter  hydrALAZINE Injectable 10 milliGRAM(s) IV Push every 6 hours PRN SBP >170  ondansetron Injectable 4 milliGRAM(s) IV Push every 6 hours PRN Nausea      Vital Signs Last 24 Hrs  T(C): 36.8 (20 Jun 2020 22:30), Max: 36.8 (20 Jun 2020 22:30)  T(F): 98.2 (20 Jun 2020 22:30), Max: 98.2 (20 Jun 2020 22:30)  HR: 85 (21 Jun 2020 05:11) (76 - 88)  BP: 152/78 (21 Jun 2020 05:11) (112/73 - 152/78)  BP(mean): --  RR: 19 (20 Jun 2020 22:30) (18 - 19)  SpO2: 93% (20 Jun 2020 22:30) (90% - 93%)    Constitutional: patient resting comfortably in bed, in no acute distress  HEENT: EOMI / PERRL b/l, no active drainage or redness  Neck: No JVD, full ROM without pain  Respiratory: respirations are unlabored, no accessory muscle use, no conversational dyspnea  Cardiovascular: regular rate & rhythm  Gastrointestinal: Abdomen soft, non-tender, mildly distended, no rebound tenderness / guarding  Neurological: GCS: 15 (4/5/6). A&O x 3; no gross sensory / motor / coordination deficits        I&O's Detail    20 Jun 2020 07:01  -  21 Jun 2020 07:00  --------------------------------------------------------  IN:  Total IN: 0 mL    OUT:    Nasoenteral Tube: 300 mL    Voided: 425 mL  Total OUT: 725 mL    Total NET: -725 mL          LABS:                        15.3   10.33 )-----------( 212      ( 20 Jun 2020 07:54 )             45.4     06-20    137  |  93<L>  |  27.0<H>  ----------------------------<  162<H>  3.4<L>   |  29.0  |  0.67    Ca    9.0      20 Jun 2020 07:54  Phos  3.2     06-20  Mg     2.4     06-20    TPro  6.8  /  Alb  3.7  /  TBili  0.8  /  DBili  x   /  AST  24  /  ALT  20  /  AlkPhos  83  06-20

## 2020-06-21 NOTE — PROGRESS NOTE ADULT - ASSESSMENT
69 y/o male with history of DM-2, HTN, HLD and BPH, who presented to ED with CC of suprapubic pain, hematuria, and straining worsening for 1 day. At ED patient was found to be on urinary retention, bustamante placed for decompression and CBI. Hyperthropic Prostate on physical exam. CT abdomen showed a 4- 5 cm hyperdense area in the bladder likely a clot but can't r/o a mass. No hydronephrosis on CT. Urology Following, d/c'd bustamante on 6/19 after urine cleared. Regarding CT findings, Urology considers it is likely a clot and will need cytoscopy as Outpt. Also noticed on admission Hypertensive urgency, now resolved with BP at goal. During the hospital stay, started to have nausea/vomiting and found to have an obstruction (pending official xray read). NGT placed, Surgery followed.   NGT removed and pt currently tolerating clear liquid diet, advanced as tolerated. Sx signed off.    Ileus of unclear etiology Constipation Vs Urinary retention/ Bladder distension  +leucocytosis likely reactive, now resolved   - S/p NGT, actively draining.   - Mild hypokalemia today, likely 2/2 vomiting on 06/19. Replace with K-riders x3.   - KUB pre NGT: gastric outlet obstruction  - NPO - IVF: NS 1L bolus x1 Maintenance D5+ cc/hr x 1 day. Fingersticks, stable now.   - Zofran PRN nausea/ vomiting.   - Ambulation encouraged.  - Status improved, pt's pain controlled.  - Surgery recs noted and appreciated.  - NGT removed, pt able to tolerate PO, +BMs  - Advancing diet as tolerated    Prerenal azotemia -  likely decreased oral intake/+ nausea/vomiting   -c/w hydration   -monitor in AM    Acute Urinary retention complicated with hematuria  - Hematuria resolved.    in the setting of an abnormal hyperdensity in the bladder (mass vs clot)   - Likely 2/2 BPH, s/p bustamante.  - Bustamante removed on 06/19 by urology, patient voiding frequently.   - Urine culture +Morganella morganii, <50.000 colonies. Will not treat at this time    - C/w Flomax  - Urology eval/ recs noted and appreciated  -OP cysto findings     Hypophosphatemia - resolved.     Hypertensive Urgency- resolved.   - Noted on admission.  - c/w  Norvasc - C/w enalapril   - Hydralazine 10 q6 PRN sbp >170  - BP goal considering h/o T2DM is <140/90  - C/w low DASH/ TLC diet    T2DM  a1c 8.7  - On Metformin and glimepiride as outpt. Will hold during this admission  - Hold Lantus while NPO and then add sliding scale with scale to start over 200   - Q6 FS while npo. +hypoglycemia protocol    Prophylactic measures:  - dvt ppx: IMPROVE VTE 1(>61 y/o) low risk for DVT. No AC due to Hematuria.     Dispo: LOS likely 1-2 days pending resolution of hematuria.     - Patient requested his spouse (Anum Farley [phone]).  - Spouse contacted on 06/20. All questions answered.

## 2020-06-21 NOTE — PROGRESS NOTE ADULT - ASSESSMENT
70 year old male presents with acute onset abdominal distension concerning for ileus. Gastrografin administered via NGT, patient with large BM. Abdominal distention improving. Advanced to CLD.    -Advance diet as tolerated  -Monitor BM  -Continue bowel regimen   -Surgery will sign off at this time

## 2020-06-21 NOTE — PROGRESS NOTE ADULT - SUBJECTIVE AND OBJECTIVE BOX
70y Male,   Patient is a 70y old  Male who presents with a chief complaint of Gross Hematuria  Bladder Mass (20 Jun 2020 00:44)    INTERVAL/OVERNIGHT EVENTS:    Patient examined at beside. No acute events over night.  NGT removed yesteday. Attest BM this morning, tolerating PO clear liquid diet  Patient denies melena/hematochezia, chest pain, calf pain, abdominal pain, headaches, fever, chills, dysuria, hematuria, diarrhea, constipation, SOB, palpitations.   Rest of ROS not contributory except for above.    Vitals:  Vital Signs Last 24 Hrs  T(C): 37.1 (21 Jun 2020 15:27), Max: 37.1 (21 Jun 2020 15:27)  T(F): 98.7 (21 Jun 2020 15:27), Max: 98.7 (21 Jun 2020 15:27)  HR: 68 (21 Jun 2020 15:27) (68 - 88)  BP: 111/73 (21 Jun 2020 15:27) (111/73 - 152/78)  RR: 18 (21 Jun 2020 15:27) (18 - 19)  SpO2: 93% (20 Jun 2020 22:30) (90% - 93%)    I&O's Summary  20 Jun 2020 07:01  -  21 Jun 2020 07:00  --------------------------------------------------------  IN: 0 mL / OUT: 725 mL / NET: -725 mL    21 Jun 2020 07:01  -  21 Jun 2020 15:50  --------------------------------------------------------  IN: 0 mL / OUT: 150 mL / NET: -150 mL    PHYSICAL EXAM:  GENERAL: AA&O x4. Not in acute distress.  HEENT: Midly dry oral mucosa.  clear sclera b/l.   CARDIOVASCULAR: RRR, S1/S2+. No edema.  RESPIRATORY: Lungs are clear to auscultation B/l  GASTROINTESTINAL: Abdomen is soft, non tended, mildly distended. Bowel sounds present on 4 quadrants  EXTREMITIES: No clubbing, cyanosis or edema. No calf tenderness. Distal pulses wnl.   MUSCULOSKELETAL: 5/5 muscle strength in UE and LE.   SKIN: warm and dry with normal turgor.    LABS    CAPILLARY BLOOD GLUCOSE  POCT Blood Glucose.: 152 mg/dL (21 Jun 2020 10:33)  POCT Blood Glucose.: 102 mg/dL (21 Jun 2020 07:35)  POCT Blood Glucose.: 156 mg/dL (20 Jun 2020 21:15)  POCT Blood Glucose.: 147 mg/dL (20 Jun 2020 18:51)    MEDICATIONS  (STANDING):  amLODIPine   Tablet 10 milliGRAM(s) Oral daily  dextrose 5%. 1000 milliLiter(s) (50 mL/Hr) IV Continuous <Continuous>  dextrose 50% Injectable 12.5 Gram(s) IV Push once  dextrose 50% Injectable 25 Gram(s) IV Push once  dextrose 50% Injectable 25 Gram(s) IV Push once  enalapril 20 milliGRAM(s) Oral daily  finasteride 5 milliGRAM(s) Oral daily  insulin glargine Injectable (LANTUS) 12 Unit(s) SubCutaneous at bedtime  insulin lispro (HumaLOG) corrective regimen sliding scale   SubCutaneous Before meals and at bedtime  sodium chloride 0.9% lock flush 3 milliLiter(s) IV Push every 8 hours  tamsulosin 0.8 milliGRAM(s) Oral at bedtime    MEDICATIONS  (PRN):  dextrose 40% Gel 15 Gram(s) Oral once PRN Blood Glucose LESS THAN 70 milliGRAM(s)/deciliter  glucagon  Injectable 1 milliGRAM(s) IntraMuscular once PRN Glucose LESS THAN 70 milligrams/deciliter  hydrALAZINE Injectable 10 milliGRAM(s) IV Push every 6 hours PRN SBP >170  ondansetron Injectable 4 milliGRAM(s) IV Push every 6 hours PRN Nausea

## 2020-06-22 ENCOUNTER — TRANSCRIPTION ENCOUNTER (OUTPATIENT)
Age: 70
End: 2020-06-22

## 2020-06-22 VITALS
OXYGEN SATURATION: 98 % | RESPIRATION RATE: 18 BRPM | HEART RATE: 84 BPM | TEMPERATURE: 98 F | SYSTOLIC BLOOD PRESSURE: 120 MMHG | DIASTOLIC BLOOD PRESSURE: 72 MMHG

## 2020-06-22 LAB
GLUCOSE BLDC GLUCOMTR-MCNC: 123 MG/DL — HIGH (ref 70–99)
GLUCOSE BLDC GLUCOMTR-MCNC: 152 MG/DL — HIGH (ref 70–99)

## 2020-06-22 PROCEDURE — 86803 HEPATITIS C AB TEST: CPT

## 2020-06-22 PROCEDURE — 86900 BLOOD TYPING SEROLOGIC ABO: CPT

## 2020-06-22 PROCEDURE — 82962 GLUCOSE BLOOD TEST: CPT

## 2020-06-22 PROCEDURE — 74018 RADEX ABDOMEN 1 VIEW: CPT

## 2020-06-22 PROCEDURE — 85610 PROTHROMBIN TIME: CPT

## 2020-06-22 PROCEDURE — 87186 SC STD MICRODIL/AGAR DIL: CPT

## 2020-06-22 PROCEDURE — 87086 URINE CULTURE/COLONY COUNT: CPT

## 2020-06-22 PROCEDURE — 81001 URINALYSIS AUTO W/SCOPE: CPT

## 2020-06-22 PROCEDURE — 86850 RBC ANTIBODY SCREEN: CPT

## 2020-06-22 PROCEDURE — 87635 SARS-COV-2 COVID-19 AMP PRB: CPT

## 2020-06-22 PROCEDURE — 36415 COLL VENOUS BLD VENIPUNCTURE: CPT

## 2020-06-22 PROCEDURE — 84100 ASSAY OF PHOSPHORUS: CPT

## 2020-06-22 PROCEDURE — 71045 X-RAY EXAM CHEST 1 VIEW: CPT

## 2020-06-22 PROCEDURE — 83036 HEMOGLOBIN GLYCOSYLATED A1C: CPT

## 2020-06-22 PROCEDURE — 86901 BLOOD TYPING SEROLOGIC RH(D): CPT

## 2020-06-22 PROCEDURE — 99239 HOSP IP/OBS DSCHRG MGMT >30: CPT | Mod: GC

## 2020-06-22 PROCEDURE — 74176 CT ABD & PELVIS W/O CONTRAST: CPT

## 2020-06-22 PROCEDURE — 99285 EMERGENCY DEPT VISIT HI MDM: CPT

## 2020-06-22 PROCEDURE — 85730 THROMBOPLASTIN TIME PARTIAL: CPT

## 2020-06-22 PROCEDURE — 80048 BASIC METABOLIC PNL TOTAL CA: CPT

## 2020-06-22 PROCEDURE — 85027 COMPLETE CBC AUTOMATED: CPT

## 2020-06-22 PROCEDURE — T1013: CPT

## 2020-06-22 PROCEDURE — 80053 COMPREHEN METABOLIC PANEL: CPT

## 2020-06-22 PROCEDURE — 86769 SARS-COV-2 COVID-19 ANTIBODY: CPT

## 2020-06-22 PROCEDURE — 83735 ASSAY OF MAGNESIUM: CPT

## 2020-06-22 RX ORDER — AMLODIPINE BESYLATE 2.5 MG/1
1 TABLET ORAL
Qty: 0 | Refills: 0 | DISCHARGE

## 2020-06-22 RX ORDER — TAMSULOSIN HYDROCHLORIDE 0.4 MG/1
0.8 CAPSULE ORAL
Qty: 0 | Refills: 0 | DISCHARGE

## 2020-06-22 RX ORDER — TAMSULOSIN HYDROCHLORIDE 0.4 MG/1
2 CAPSULE ORAL
Qty: 60 | Refills: 0
Start: 2020-06-22 | End: 2020-07-21

## 2020-06-22 RX ADMIN — FINASTERIDE 5 MILLIGRAM(S): 5 TABLET, FILM COATED ORAL at 12:17

## 2020-06-22 RX ADMIN — Medication 2: at 12:16

## 2020-06-22 RX ADMIN — SODIUM CHLORIDE 3 MILLILITER(S): 9 INJECTION INTRAMUSCULAR; INTRAVENOUS; SUBCUTANEOUS at 06:26

## 2020-06-22 RX ADMIN — AMLODIPINE BESYLATE 10 MILLIGRAM(S): 2.5 TABLET ORAL at 06:33

## 2020-06-22 RX ADMIN — SODIUM CHLORIDE 3 MILLILITER(S): 9 INJECTION INTRAMUSCULAR; INTRAVENOUS; SUBCUTANEOUS at 14:00

## 2020-06-22 RX ADMIN — Medication 20 MILLIGRAM(S): at 06:33

## 2020-06-22 NOTE — DISCHARGE NOTE NURSING/CASE MANAGEMENT/SOCIAL WORK - PATIENT PORTAL LINK FT
You can access the FollowMyHealth Patient Portal offered by [facility] by registering at the following website: [email or web address]. By joining [employer]’s FollowMyHealth portal, you will also be able to view your health information using other applications (apps) compatible with our system.

## 2020-06-22 NOTE — PROGRESS NOTE ADULT - REASON FOR ADMISSION
Gross Hematuria  Bladder Mass

## 2020-06-22 NOTE — PROGRESS NOTE ADULT - ATTENDING COMMENTS
Pt seen and examined with surgery team. Agree with note above    +BM  followup gastrograffin challenge
Note addended where needed. Plan discussed with patient at bedside
Note addended where needed. Plan discussed with patient at bedside with Lithuanian speaking resident.
Should discharge on tamsulosin and finasteride.  Could follow up with Dr. Wade or with us.

## 2020-06-22 NOTE — PROGRESS NOTE ADULT - SUBJECTIVE AND OBJECTIVE BOX
Subjective:70yMale admitted with clot retention, gross hematuria.  Pt passed voiding trial, has been voiding well.  Pt resting comfortably, no c/o pain or discomfort.  Pt tolerating diet.       Vital Signs Last 24 Hrs  T(C): 36.9 (22 Jun 2020 07:30), Max: 37.1 (21 Jun 2020 15:27)  T(F): 98.5 (22 Jun 2020 07:30), Max: 98.8 (21 Jun 2020 23:50)  HR: 73 (22 Jun 2020 07:30) (68 - 87)  BP: 128/73 (22 Jun 2020 07:30) (111/73 - 143/68)  BP(mean): --  RR: 18 (22 Jun 2020 07:30) (18 - 18)  SpO2: 92% (22 Jun 2020 07:30) (91% - 92%)  I&O's Detail    21 Jun 2020 07:01  -  22 Jun 2020 07:00  --------------------------------------------------------  IN:  Total IN: 0 mL    OUT:    Voided: 150 mL  Total OUT: 150 mL    Total NET: -150 mL          Labs:

## 2022-07-06 ENCOUNTER — EMERGENCY (EMERGENCY)
Facility: HOSPITAL | Age: 72
LOS: 1 days | Discharge: HOME | End: 2022-07-06
Attending: EMERGENCY MEDICINE
Payer: MEDICARE

## 2022-07-06 VITALS
TEMPERATURE: 98 F | DIASTOLIC BLOOD PRESSURE: 80 MMHG | OXYGEN SATURATION: 98 % | WEIGHT: 220.02 LBS | SYSTOLIC BLOOD PRESSURE: 175 MMHG | RESPIRATION RATE: 18 BRPM | HEART RATE: 75 BPM | HEIGHT: 68.9 IN

## 2022-07-06 DIAGNOSIS — Z90.49 ACQUIRED ABSENCE OF OTHER SPECIFIED PARTS OF DIGESTIVE TRACT: Chronic | ICD-10-CM
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
## 2022-07-06 PROBLEM — E11.9 TYPE 2 DIABETES MELLITUS WITHOUT COMPLICATIONS: Chronic | Status: ACTIVE | Noted: 2020-06-16

## 2022-07-06 PROBLEM — I10 ESSENTIAL (PRIMARY) HYPERTENSION: Chronic | Status: ACTIVE | Noted: 2020-06-16

## 2022-07-06 PROBLEM — N40.0 BENIGN PROSTATIC HYPERPLASIA WITHOUT LOWER URINARY TRACT SYMPTOMS: Chronic | Status: ACTIVE | Noted: 2020-06-16

## 2022-07-06 PROCEDURE — 87070 CULTURE OTHR SPECIMN AEROBIC: CPT

## 2022-07-06 PROCEDURE — 87205 SMEAR GRAM STAIN: CPT

## 2022-07-06 PROCEDURE — 99283 EMERGENCY DEPT VISIT LOW MDM: CPT | Mod: 25

## 2022-07-06 PROCEDURE — 87077 CULTURE AEROBIC IDENTIFY: CPT

## 2022-07-06 PROCEDURE — 10060 I&D ABSCESS SIMPLE/SINGLE: CPT

## 2022-07-06 PROCEDURE — 10061 I&D ABSCESS COMP/MULTIPLE: CPT

## 2022-07-06 PROCEDURE — 87075 CULTR BACTERIA EXCEPT BLOOD: CPT

## 2022-07-06 PROCEDURE — 99284 EMERGENCY DEPT VISIT MOD MDM: CPT | Mod: 25

## 2022-07-06 RX ORDER — ACETAMINOPHEN 500 MG
650 TABLET ORAL ONCE
Refills: 0 | Status: COMPLETED | OUTPATIENT
Start: 2022-07-06 | End: 2022-07-06

## 2022-07-06 RX ADMIN — Medication 650 MILLIGRAM(S): at 11:53

## 2022-07-06 NOTE — ED ADULT TRIAGE NOTE - CHIEF COMPLAINT QUOTE
abscess to right chest wall red no drainage. denies fever. hx of diabetes. had biopsy at site years ago. hx of htn did not take meds today.

## 2022-07-06 NOTE — ED PROCEDURE NOTE - CPROC ED INFORMED CONSENT1
Benefits, risks, and possible complications of procedure explained to patient/caregiver who verbalized understanding and gave verbal consent.
Normal

## 2022-07-06 NOTE — ED PROVIDER NOTE - OBJECTIVE STATEMENT
72M with HTN and DM presenting with erythema and pain to the R chest wall x 1 week consistent with abscess. In 2017 pt presented to PCP with similar presentation. PCP did a bx which was "benign." Two weeks ago pt went to PCP for the cyst becoming "hard" and was given 2 weeks of amoxacillin. While on the abx, erythema and pain increased. For 1 week, has 2 superficial spots where minimal pus was draining.

## 2022-07-06 NOTE — ED PROVIDER NOTE - NS ED ATTENDING STATEMENT MOD
This was a shared visit with the POONAM. I reviewed and verified the documentation and independently performed the documented:

## 2022-07-06 NOTE — ED PROVIDER NOTE - CLINICAL SUMMARY MEDICAL DECISION MAKING FREE TEXT BOX
Presenting with R chest abscess. I&D done Presenting with R chest abscess. I&D done and wound packed. Instructed pt to return in 2 days to PCP or ED for wound check and packing removal. Instructed to stop amoxacillin. Rx clinda x 7 days. Reviewed ED return instructions with pt and family.

## 2022-07-06 NOTE — ED PROVIDER NOTE - NSFOLLOWUPINSTRUCTIONS_ED_ALL_ED_FT
Abscess    An abscess is an infected area that contains a collection of pus and debris. It can occur in almost any part of the body and occurs when the tissue gets infection. Symptoms include a painful mass that is red, warm, tender that might break open and HAVE drainage. If your health care provider gave you antibiotics make sure to take the full course and do not stop even if feeling better.     SEEK IMMEDIATE MEDICAL CARE IF YOU HAVE ANY OF THE FOLLOWING SYMPTOMS: chills, fever, muscle aches, or red streaking from the area.    Follow up with PCP within 2 days.

## 2022-07-06 NOTE — ED PROCEDURE NOTE - CPROC ED SITE PREP1
[facility] Home  Complex Care Plan  About Me  Patient Name:  Stefano Schuster    YOB: 2009  Age:     9 year old   [facility] MRN:   [id number] Telephone Information:  Home Phone [phone]   Mobile [phone]       Address:    [address] [facility] [address]  [place] [address] Email address:  No e-mail address on record      Emergency Contact(s)  Name Relationship Lgl Grd Work Phone Home Phone Mobile Phone   1. CHANCE SCHUSTER* Mother  none [phone]   2. LANDEN M* Grandparent   NONE [phone]           Primary language:  English     needed? No   [facility] [employer]:  [phone] op. 1  Other communication barriers: Physical impairment, Caregiver  Preferred Method of Communication:  Mail  Current living arrangement:    Mobility Status/ Medical Equipment:      Health Maintenance  Health Maintenance Reviewed:      My Access Plan  Medical Emergency 911   Primary Clinic Line [facility] [place] - [phone]   24 Hour Appointment Line [phone] or  [phone]) (toll-free)   24 Hour Nurse Line [phone] (toll-free)   Preferred Urgent Care     Preferred Hospital     Preferred Pharmacy [id number]  [place], [place] - [address] [facility]     Behavioral Health Crisis Line The National Suicide Prevention Lifeline at 1-968.831.7155 or 911     My Care Team Members    Patient Care Team       Relationship Specialty Notifications Start End    Angela [place] MD PCP - General Internal Medicine  8/12/14     Phone: [phone] Fax: [phone]         [address] [place] [address] [address] [place] [address] [address]    Angela Luna MD PCP - Assigned PCP   10/7/18 3/5/19    Phone: [phone] Fax: [phone]         [address] [place] [address] [address] [place] [address]    Belem Sarkar, RN Nurse Coordinator  Admissions 9/4/14     Pediatric Weight Management Nurse Coordinator ph. [phone], pg. [phone]    Phone: [phone]  Pager: [phone]        Cristina Morales MD MD Pediatrics  1/12/15     Phone: [phone] Fax: [phone]         [address] [place], [place] [id number]    Estephania Joseph MD MD Pediatrics  4/22/15     Phone: [phone] Fax: [phone]         [facility] [address] [address]    Katerine Gonzalez RD Registered Dietitian Dietitian, Registered  4/22/15     Phone: [phone]          [place] [id number] [facility] [address]    Latia Del Rio, PhD LP  Psychology  4/27/15     Phone: [phone] Fax: [phone]         [address] [place], [place] [id number]    Xavier Edmond MD MD Pediatric Nephrology  7/1/15     Phone: [phone] Fax: [phone]         [address] [place] [address]    Lisa Amaya, RN Lead Care Coordinator Primary Care - CC  9/18/18     Bertha Cline School Worker   9/20/18      [employer] Psychologist    Phone: [phone]         Mary De La O School Worker   9/20/18      School  (can drive to appointments) consent on file Paul Watts.    Phone: [phone]                 My Care Plans  Self Management and Treatment Plan  Goals and (Comments)  Goals        General    Medical (pt-stated)     Notes - Note created  9/19/2018  1:19 PM by Lisa Amaya, RN    Goal Statement: I will consistently attend all of my recommended follow up appointments with the interdisciplinary medical team  Measure of Success: Evidenced by establishment of appointments with the following recommended providers:    Development Pediatrics    Gastroenterology ([facility])    Weight Management (Dr. Joseph)    [facility] Choices Assessment for PCA/Waiver Services     Supportive Steps to Achieve: I will use Thursdays as a reliable appointment day to communicate with providers      I will use the school resources provided to me for any transportation or      scheduling barriers      I will update the care team of  any changes to my phone or address  Barriers: Stefano's mom is a single mom and works during the day 9:00-2:30, and has had a history of unreliable transportation; there are a lot of complex resources for her to navigate at once  Strengths: Mom was accompanied by school support, and has consistent communication from them; she was engaged in goal setting and agreed to consistent follow up  Date to Achieve By: 6 months of consistent attendance with multidisciplinary team  Patient expressed understanding of goal: Yes-patient and care team mutually agreed upon goals                   Advance Care Plans/Directives Type:        My Medical and Care Information  Problem List   Patient Active Problem List   Diagnosis     Health Care Home     Macrocephaly     Acanthosis nigricans     Gross motor delay     Behavior problem in child     Fine motor delay     Severe obesity (H)     Heart murmur     Hypertension     Morbid obesity, unspecified obesity type (H)          Care Coordination Start Date: 9/14/2018   Frequency of Care Coordination: monthly   Form Last Updated: 03/05/2019        povidone iodine

## 2022-07-06 NOTE — ED PROVIDER NOTE - PATIENT PORTAL LINK FT
You can access the FollowMyHealth Patient Portal offered by [facility] by registering at the following website: [email or web address]. By joining [employer]’s FollowMyHealth portal, you will also be able to view your health information using other applications (apps) compatible with our system.

## 2022-07-08 ENCOUNTER — EMERGENCY (EMERGENCY)
Facility: HOSPITAL | Age: 72
LOS: 1 days | Discharge: HOME | End: 2022-07-08
Attending: EMERGENCY MEDICINE
Payer: MEDICARE

## 2022-07-08 VITALS
RESPIRATION RATE: 18 BRPM | TEMPERATURE: 98 F | OXYGEN SATURATION: 98 % | HEIGHT: 68 IN | HEART RATE: 74 BPM | SYSTOLIC BLOOD PRESSURE: 141 MMHG | WEIGHT: 220.02 LBS | DIASTOLIC BLOOD PRESSURE: 77 MMHG

## 2022-07-08 DIAGNOSIS — Z90.49 ACQUIRED ABSENCE OF OTHER SPECIFIED PARTS OF DIGESTIVE TRACT: Chronic | ICD-10-CM
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
## 2022-07-08 PROCEDURE — 99281 EMR DPT VST MAYX REQ PHY/QHP: CPT

## 2022-07-08 PROCEDURE — 99282 EMERGENCY DEPT VISIT SF MDM: CPT

## 2022-07-08 NOTE — ED ADULT TRIAGE NOTE - CHIEF COMPLAINT QUOTE
pt states he is here for drain to be removed, was here 2 days ago for right chest abcess  A&Ox3, resp wnl

## 2022-07-08 NOTE — ED PROVIDER NOTE - CLINICAL SUMMARY MEDICAL DECISION MAKING FREE TEXT BOX
s/p packing removal. Wound irrigated. Explained dressing changes at home with family. Extended abx to 10 day course. Discussed ED return precautions.

## 2022-07-08 NOTE — ED PROVIDER NOTE - NSFOLLOWUPINSTRUCTIONS_ED_ALL_ED_FT
Abscess    An abscess is an infected area that contains a collection of pus and debris. It can occur in almost any part of the body and occurs when the tissue gets infection. Symptoms include a painful mass that is red, warm, tender that might break open and HAVE drainage. If your health care provider gave you antibiotics make sure to take the full course and do not stop even if feeling better.     SEEK IMMEDIATE MEDICAL CARE IF YOU HAVE ANY OF THE FOLLOWING SYMPTOMS: chills, fever, muscle aches, or red streaking from the area.    Follow up with PCP and surgeon within 3 days.

## 2022-07-08 NOTE — ED PROVIDER NOTE - PATIENT PORTAL LINK FT
You can access the FollowMyHealth Patient Portal offered by [facility] by registering at the following website: [email or web address]. By joining [employer]’s FollowMyHealth portal, you will also be able to view your health information using other applications (apps) compatible with our system.

## 2022-07-08 NOTE — ED PROVIDER NOTE - OBJECTIVE STATEMENT
72M with HTN, DM presenting for wound check and packing removal after R chest wall abscess was drained 2 days ago. Feeling improved with less pain at the site.

## 2022-07-08 NOTE — ED PROVIDER NOTE - NSFOLLOWUPCLINICS_GEN_ALL_ED_FT
[facility] Acute Care Surgery  Acute Care Surgery  [address] [place] [address]  Phone: [phone]  Fax:

## 2022-07-08 NOTE — ED PROVIDER NOTE - ATTENDING APP SHARED VISIT CONTRIBUTION OF CARE
I, Kayden Medley, have personally performed a face to face diagnostic evaluation on this patient. I have reviewed the POONAM note and agree with the history, exam and plan of care, except as noted.    71 yo M hx of HTN, and DM p/w wound check. patient had I&D of right chest with packing placed 7/6/22. packing removed. small bleeding. no purlent discharge. continue with clindamycin.

## 2022-11-01 ENCOUNTER — OFFICE (OUTPATIENT)
Dept: URBAN - METROPOLITAN AREA CLINIC 63 | Facility: CLINIC | Age: 72
Setting detail: OPHTHALMOLOGY
End: 2022-11-01
Payer: MEDICARE

## 2022-11-01 DIAGNOSIS — H35.3131: ICD-10-CM

## 2022-11-01 DIAGNOSIS — H01.005: ICD-10-CM

## 2022-11-01 DIAGNOSIS — H01.002: ICD-10-CM

## 2022-11-01 DIAGNOSIS — H02.011: ICD-10-CM

## 2022-11-01 DIAGNOSIS — H16.223: ICD-10-CM

## 2022-11-01 PROCEDURE — 92134 CPTRZ OPH DX IMG PST SGM RTA: CPT | Performed by: OPHTHALMOLOGY

## 2022-11-01 PROCEDURE — 83861 MICROFLUID ANALY TEARS: CPT | Performed by: OPHTHALMOLOGY

## 2022-11-01 PROCEDURE — 67820 REVISE EYELASHES: CPT | Performed by: OPHTHALMOLOGY

## 2022-11-01 PROCEDURE — 99214 OFFICE O/P EST MOD 30 MIN: CPT | Performed by: OPHTHALMOLOGY

## 2022-11-01 ASSESSMENT — REFRACTION_AUTOREFRACTION
OS_SPHERE: +0.75
OD_CYLINDER: +6.00
OS_CYLINDER: -1.75
OS_AXIS: 007
OD_AXIS: 058

## 2022-11-01 ASSESSMENT — LID EXAM ASSESSMENTS
OD_COMMENTS: 1 LASH TOUCHING K S/P EPILATION TOLERATED WELL
OD_TRICHIASIS: RUL T
OD_BLEPHARITIS: RLL 2+
OS_BLEPHARITIS: LLL 2+

## 2022-11-01 ASSESSMENT — KERATOMETRY
OD_K2POWER_DIOPTERS: 43.00
OD_AXISANGLE_DEGREES: 155
OD_K1POWER_DIOPTERS: 42.75
OS_K1POWER_DIOPTERS: 41.75
OS_K2POWER_DIOPTERS: 43.00
OS_AXISANGLE_DEGREES: 103

## 2022-11-01 ASSESSMENT — SPHEQUIV_DERIVED
OD_SPHEQUIV: -1
OS_SPHEQUIV: 1
OS_SPHEQUIV: -0.125

## 2022-11-01 ASSESSMENT — REFRACTION_MANIFEST
OS_AXIS: 050
OD_AXIS: 160
OS_SPHERE: +1.25
OD_CYLINDER: -1.50
OS_CYLINDER: -0.50
OD_SPHERE: -0.25
OS_VA1: 20/NI
OD_VA1: 20/NI

## 2022-11-01 ASSESSMENT — SUPERFICIAL PUNCTATE KERATITIS (SPK)
OD_SPK: T
OS_SPK: T

## 2022-11-01 ASSESSMENT — VISUAL ACUITY
OD_BCVA: 20/25
OS_BCVA: 20/20-1

## 2022-11-01 ASSESSMENT — AXIALLENGTH_DERIVED
OS_AL: 24.0631
OS_AL: 23.6155
OD_AL: 24.2278

## 2022-11-01 ASSESSMENT — TONOMETRY
OD_IOP_MMHG: 16
OS_IOP_MMHG: 18

## 2022-11-01 ASSESSMENT — CONFRONTATIONAL VISUAL FIELD TEST (CVF)
OD_FINDINGS: FULL
OS_FINDINGS: FULL

## 2022-11-23 NOTE — PROGRESS NOTE ADULT - ASSESSMENT
Tylenol for pain. Take antibiotics until complete. Avoid nuts and seeds. Consider calling your primary care doctor or gastroenterologist to recheck. Recheck sooner for worse bleeding high fever or uncontrolled pain. Also check in with your urologist regarding the left-sided kidney stone. 69 yo male with resolved hematuria, occasional bladder spasms  - bladder scan after next void to ensure pt not retaining  - continue present care and d/c planning as per primary team  - pt to f/u with his urologist Dr. Arnold as OP - should have cystoscopy

## 2023-05-02 ENCOUNTER — OFFICE (OUTPATIENT)
Dept: URBAN - METROPOLITAN AREA CLINIC 63 | Facility: CLINIC | Age: 73
Setting detail: OPHTHALMOLOGY
End: 2023-05-02
Payer: MEDICARE

## 2023-05-02 DIAGNOSIS — H16.222: ICD-10-CM

## 2023-05-02 DIAGNOSIS — H35.033: ICD-10-CM

## 2023-05-02 DIAGNOSIS — H16.223: ICD-10-CM

## 2023-05-02 DIAGNOSIS — H35.3131: ICD-10-CM

## 2023-05-02 DIAGNOSIS — E11.9: ICD-10-CM

## 2023-05-02 DIAGNOSIS — Z96.1: ICD-10-CM

## 2023-05-02 DIAGNOSIS — H16.221: ICD-10-CM

## 2023-05-02 PROCEDURE — 83861 MICROFLUID ANALY TEARS: CPT | Performed by: OPHTHALMOLOGY

## 2023-05-02 PROCEDURE — 92250 FUNDUS PHOTOGRAPHY W/I&R: CPT | Performed by: OPHTHALMOLOGY

## 2023-05-02 PROCEDURE — 99214 OFFICE O/P EST MOD 30 MIN: CPT | Performed by: OPHTHALMOLOGY

## 2023-05-02 ASSESSMENT — REFRACTION_MANIFEST
OD_VA1: 20/NI
OS_CYLINDER: -0.50
OD_AXIS: 160
OS_SPHERE: +1.25
OS_VA1: 20/NI
OD_SPHERE: -0.25
OS_AXIS: 050
OD_CYLINDER: -1.50

## 2023-05-02 ASSESSMENT — KERATOMETRY
OD_K1POWER_DIOPTERS: 42.75
OS_K1POWER_DIOPTERS: 41.75
OD_K2POWER_DIOPTERS: 43.00
OS_K2POWER_DIOPTERS: 43.00
OD_AXISANGLE_DEGREES: 155
OS_AXISANGLE_DEGREES: 103

## 2023-05-02 ASSESSMENT — VISUAL ACUITY
OD_BCVA: 20/20-1
OS_BCVA: 20/20

## 2023-05-02 ASSESSMENT — REFRACTION_AUTOREFRACTION
OS_SPHERE: +0.50
OS_CYLINDER: -1.50
OS_AXIS: 015
OD_SPHERE: 0.00
OD_CYLINDER: -0.50
OD_AXIS: 180

## 2023-05-02 ASSESSMENT — AXIALLENGTH_DERIVED
OS_AL: 23.6155
OD_AL: 24.2278
OD_AL: 23.9235
OS_AL: 24.1138

## 2023-05-02 ASSESSMENT — CONFRONTATIONAL VISUAL FIELD TEST (CVF)
OS_FINDINGS: FULL
OD_FINDINGS: FULL

## 2023-05-02 ASSESSMENT — SPHEQUIV_DERIVED
OD_SPHEQUIV: -1
OS_SPHEQUIV: -0.25
OD_SPHEQUIV: -0.25
OS_SPHEQUIV: 1

## 2023-05-02 ASSESSMENT — SUPERFICIAL PUNCTATE KERATITIS (SPK)
OS_SPK: T
OD_SPK: T

## 2023-05-02 ASSESSMENT — TONOMETRY
OS_IOP_MMHG: 16
OD_IOP_MMHG: 16

## 2023-05-02 ASSESSMENT — LID EXAM ASSESSMENTS
OS_BLEPHARITIS: LLL 1+
OD_BLEPHARITIS: RLL 1+
OD_TRICHIASIS: ABSENT

## 2023-06-16 PROBLEM — Z00.00 ENCOUNTER FOR PREVENTIVE HEALTH EXAMINATION: Status: ACTIVE | Noted: 2023-06-16

## 2023-06-19 ENCOUNTER — APPOINTMENT (OUTPATIENT)
Dept: ORTHOPEDIC SURGERY | Facility: CLINIC | Age: 73
End: 2023-06-19
Payer: MEDICARE

## 2023-06-19 VITALS — HEART RATE: 84 BPM | SYSTOLIC BLOOD PRESSURE: 183 MMHG | DIASTOLIC BLOOD PRESSURE: 93 MMHG

## 2023-06-19 DIAGNOSIS — M17.0 BILATERAL PRIMARY OSTEOARTHRITIS OF KNEE: ICD-10-CM

## 2023-06-19 PROCEDURE — 99203 OFFICE O/P NEW LOW 30 MIN: CPT

## 2023-06-19 PROCEDURE — 73564 X-RAY EXAM KNEE 4 OR MORE: CPT | Mod: 50

## 2023-06-19 RX ORDER — MELOXICAM 15 MG/1
15 TABLET ORAL
Qty: 30 | Refills: 1 | Status: ACTIVE | COMMUNITY
Start: 2023-06-19 | End: 1900-01-01

## 2023-06-19 NOTE — HISTORY OF PRESENT ILLNESS
[Pain Location] : pain [] : right & left knee [___ yrs] : [unfilled] year(s) ago [Constant] : ~He/She~ states the symptoms seem to be constant [NSAIDs] : relieved by nonsteroidal anti-inflammatory drugs [de-identified] : 73 year old male patient presents today for an initial consultation for his bilateral knee pain. Patient states the pain began 2 years ago, but he has never been seen for the pain before. The left knee is worse than the right knee. The pain is primarily on the sides and behind the knee cap. No swelling. Hears clicking. Patient takes Advil for the pain when needed.  Denies any history of trauma.  Denies any locking or buckling.  Denies any hip pain or neurovascular compromise of the lower extremity.  Denies any history of surgery

## 2023-06-19 NOTE — ADDENDUM
[FreeTextEntry1] : This note was written by Kathryn Bowden, acting as the  for Dr. Cage. This note accurately reflects the work and decisions made by Dr. Cage.

## 2023-06-19 NOTE — PHYSICAL EXAM
[de-identified] : GENERAL APPEARANCE: Well nourished and hydrated, pleasant, alert, and oriented x 3. Appears their stated age. \par HEENT: Normocephalic, extraocular eye motion intact. Nasal septum midline. Oral cavity clear. External auditory canal clear. \par RESPIRATORY: Breath sounds clear and audible in all lobes. No wheezing, No accessory muscle use.\par CARDIOVASCULAR: No apparent abnormalities. No lower leg edema. No varicosities. Pedal pulses are palpable.\par NEUROLOGIC: Sensation is normal, no muscle weakness in the upper or lower extremities.\par DERMATOLOGIC: No apparent skin lesions, moist, warm, no rash.\par SPINE: Cervical spine appears normal and moves freely; thoracic spine appears normal and moves freely; lumbosacral spine appears normal and moves freely, normal, nontender.\par MUSCULOSKELETAL: Hands, wrists, and elbows are normal and move freely, shoulders are normal and move freely. \par PSYCHIATRIC: Oriented to person, place, and time, insight and judgement were intact and the affect was normal. [de-identified] : Right knee exam shows mild effusion, ROM is 0-130 degrees, no instability, negative pain with Hakan, medial joint line tenderness.\par Left knee exam shows no effusion, ROM is 0-130 degrees, no instability, negative pain with Hakan, no joint line tenderness.\par 5/5 motor strength in bilateral lower extremities. Sensory: Intact in bilateral lower extremities. DTRs: Biceps, brachioradialis, triceps, patellar, ankle and plantar 2+ and symmetric bilaterally. Pulses: dorsalis pedis, posterior tibial, femoral, popliteal, and radial 2+ and symmetric bilaterally. [de-identified] : 4 views of bilateral knees obtained the office today show no acute fracture or dislocation.  There is patellofemoral arthritis with small osteophyte formation mild tricompartmental degenerative changes consistent with Kellgren-Raoul grade 1 2 changes

## 2023-06-19 NOTE — DISCUSSION/SUMMARY
[Medication Risks Reviewed] : Medication risks reviewed [6 Weeks] : in 6 weeks [Surgical risks reviewed] : Surgical risks reviewed [de-identified] : Patient is a 73-year-old male here today for evaluation of bilateral knee pain has been going on for the past few years.  He is having only mild symptoms at this time.  He does have mild to moderate arthritic changes patellofemoral joint.  I have therefore recommended conservative treatment this time.  I given prescription for meloxicam 15 mg daily as needed for pain.  I recommended low impact activity and exercises.  I given a prescription for physical therapy.  I will see him back on an as-needed basis for his bilateral knees.  All questions were asked and answered

## 2023-11-07 ENCOUNTER — OFFICE (OUTPATIENT)
Dept: URBAN - METROPOLITAN AREA CLINIC 63 | Facility: CLINIC | Age: 73
Setting detail: OPHTHALMOLOGY
End: 2023-11-07
Payer: MEDICARE

## 2023-11-07 DIAGNOSIS — H16.222: ICD-10-CM

## 2023-11-07 DIAGNOSIS — E11.9: ICD-10-CM

## 2023-11-07 DIAGNOSIS — H35.3131: ICD-10-CM

## 2023-11-07 DIAGNOSIS — H35.033: ICD-10-CM

## 2023-11-07 DIAGNOSIS — H01.002: ICD-10-CM

## 2023-11-07 DIAGNOSIS — H16.223: ICD-10-CM

## 2023-11-07 PROBLEM — H16.221 DRY EYE SYNDROME K SICCA; RIGHT EYE, LEFT EYE, BOTH EYES: Status: ACTIVE | Noted: 2023-11-07

## 2023-11-07 PROCEDURE — 92134 CPTRZ OPH DX IMG PST SGM RTA: CPT | Performed by: OPHTHALMOLOGY

## 2023-11-07 PROCEDURE — 83861 MICROFLUID ANALY TEARS: CPT | Performed by: OPHTHALMOLOGY

## 2023-11-07 PROCEDURE — 99214 OFFICE O/P EST MOD 30 MIN: CPT | Performed by: OPHTHALMOLOGY

## 2023-11-07 PROCEDURE — 83861 MICROFLUID ANALY TEARS: CPT | Mod: QW,LT | Performed by: OPHTHALMOLOGY

## 2023-11-07 ASSESSMENT — REFRACTION_AUTOREFRACTION
OD_SPHERE: +0.25
OS_CYLINDER: -1.50
OS_AXIS: 008
OD_CYLINDER: -0.50
OD_AXIS: 084
OS_SPHERE: +0.50

## 2023-11-07 ASSESSMENT — LID EXAM ASSESSMENTS
OD_TRICHIASIS: ABSENT
OS_BLEPHARITIS: LLL 1+
OD_BLEPHARITIS: RLL 1+

## 2023-11-07 ASSESSMENT — CONFRONTATIONAL VISUAL FIELD TEST (CVF)
OD_FINDINGS: FULL
OS_FINDINGS: FULL

## 2023-11-07 ASSESSMENT — SPHEQUIV_DERIVED
OS_SPHEQUIV: 1
OD_SPHEQUIV: -1
OD_SPHEQUIV: 0
OS_SPHEQUIV: -0.25

## 2023-11-07 ASSESSMENT — REFRACTION_MANIFEST
OS_AXIS: 050
OD_SPHERE: -0.25
OS_VA1: 20/NI
OD_VA1: 20/NI
OD_CYLINDER: -1.50
OS_CYLINDER: -0.50
OD_AXIS: 160
OS_SPHERE: +1.25

## 2023-11-07 ASSESSMENT — SUPERFICIAL PUNCTATE KERATITIS (SPK)
OD_SPK: T
OS_SPK: T

## 2024-05-07 ENCOUNTER — OFFICE (OUTPATIENT)
Dept: URBAN - METROPOLITAN AREA CLINIC 63 | Facility: CLINIC | Age: 74
Setting detail: OPHTHALMOLOGY
End: 2024-05-07
Payer: MEDICARE

## 2024-05-07 DIAGNOSIS — H01.002: ICD-10-CM

## 2024-05-07 DIAGNOSIS — E11.9: ICD-10-CM

## 2024-05-07 DIAGNOSIS — H35.3131: ICD-10-CM

## 2024-05-07 DIAGNOSIS — H16.222: ICD-10-CM

## 2024-05-07 DIAGNOSIS — H16.221: ICD-10-CM

## 2024-05-07 DIAGNOSIS — H16.223: ICD-10-CM

## 2024-05-07 DIAGNOSIS — H35.033: ICD-10-CM

## 2024-05-07 PROCEDURE — 92250 FUNDUS PHOTOGRAPHY W/I&R: CPT | Performed by: OPHTHALMOLOGY

## 2024-05-07 PROCEDURE — 99214 OFFICE O/P EST MOD 30 MIN: CPT | Performed by: OPHTHALMOLOGY

## 2024-05-07 PROCEDURE — 83861 MICROFLUID ANALY TEARS: CPT | Mod: RT | Performed by: OPHTHALMOLOGY

## 2024-05-07 PROCEDURE — 83861 MICROFLUID ANALY TEARS: CPT | Mod: LT | Performed by: OPHTHALMOLOGY

## 2024-05-07 ASSESSMENT — LID EXAM ASSESSMENTS
OS_BLEPHARITIS: LLL 1+
OD_TRICHIASIS: ABSENT
OD_BLEPHARITIS: RLL 1+

## 2024-05-07 ASSESSMENT — CONFRONTATIONAL VISUAL FIELD TEST (CVF)
OD_FINDINGS: FULL
OS_FINDINGS: FULL

## 2024-05-28 ENCOUNTER — Encounter (OUTPATIENT)
Dept: URBAN - METROPOLITAN AREA CLINIC 63 | Facility: CLINIC | Age: 74
Setting detail: OPHTHALMOLOGY
End: 2024-05-28
Payer: MEDICARE

## 2024-07-30 ENCOUNTER — APPOINTMENT (OUTPATIENT)
Dept: RHEUMATOLOGY | Facility: CLINIC | Age: 74
End: 2024-07-30
Payer: MEDICARE

## 2024-07-30 VITALS
RESPIRATION RATE: 17 BRPM | DIASTOLIC BLOOD PRESSURE: 86 MMHG | WEIGHT: 215 LBS | OXYGEN SATURATION: 96 % | SYSTOLIC BLOOD PRESSURE: 138 MMHG | HEART RATE: 71 BPM | TEMPERATURE: 97 F | BODY MASS INDEX: 35.82 KG/M2 | HEIGHT: 65 IN

## 2024-07-30 DIAGNOSIS — M25.50 PAIN IN UNSPECIFIED JOINT: ICD-10-CM

## 2024-07-30 DIAGNOSIS — M25.511 PAIN IN RIGHT SHOULDER: ICD-10-CM

## 2024-07-30 DIAGNOSIS — M25.512 PAIN IN RIGHT SHOULDER: ICD-10-CM

## 2024-07-30 DIAGNOSIS — G89.29 PAIN IN RIGHT SHOULDER: ICD-10-CM

## 2024-07-30 DIAGNOSIS — Z86.39 PERSONAL HISTORY OF OTHER ENDOCRINE, NUTRITIONAL AND METABOLIC DISEASE: ICD-10-CM

## 2024-07-30 DIAGNOSIS — M54.50 LOW BACK PAIN, UNSPECIFIED: ICD-10-CM

## 2024-07-30 DIAGNOSIS — G89.29 LOW BACK PAIN, UNSPECIFIED: ICD-10-CM

## 2024-07-30 DIAGNOSIS — Z78.9 OTHER SPECIFIED HEALTH STATUS: ICD-10-CM

## 2024-07-30 DIAGNOSIS — M15.9 POLYOSTEOARTHRITIS, UNSPECIFIED: ICD-10-CM

## 2024-07-30 LAB
ALBUMIN SERPL ELPH-MCNC: 4.4 G/DL
ALP BLD-CCNC: 100 U/L
ALT SERPL-CCNC: 12 U/L
ANION GAP SERPL CALC-SCNC: 13 MMOL/L
AST SERPL-CCNC: 17 U/L
BASOPHILS # BLD AUTO: 0.04 K/UL
BASOPHILS NFR BLD AUTO: 0.6 %
BILIRUB SERPL-MCNC: 0.5 MG/DL
BUN SERPL-MCNC: 11 MG/DL
CALCIUM SERPL-MCNC: 9.2 MG/DL
CCP AB SER IA-ACNC: <8 U/ML
CHLORIDE SERPL-SCNC: 102 MMOL/L
CK SERPL-CCNC: 43 U/L
CO2 SERPL-SCNC: 26 MMOL/L
CREAT SERPL-MCNC: 0.64 MG/DL
CRP SERPL-MCNC: 17 MG/L
EGFR: 101 ML/MIN/1.73M2
EOSINOPHIL # BLD AUTO: 0.34 K/UL
EOSINOPHIL NFR BLD AUTO: 5 %
ERYTHROCYTE [SEDIMENTATION RATE] IN BLOOD BY WESTERGREN METHOD: 20 MM/HR
GLUCOSE SERPL-MCNC: 142 MG/DL
HCT VFR BLD CALC: 40.2 %
HGB BLD-MCNC: 13.3 G/DL
IMM GRANULOCYTES NFR BLD AUTO: 0.3 %
LYMPHOCYTES # BLD AUTO: 2.44 K/UL
LYMPHOCYTES NFR BLD AUTO: 35.9 %
MAN DIFF?: NORMAL
MCHC RBC-ENTMCNC: 27.5 PG
MCHC RBC-ENTMCNC: 33.1 GM/DL
MCV RBC AUTO: 83.1 FL
MONOCYTES # BLD AUTO: 0.48 K/UL
MONOCYTES NFR BLD AUTO: 7.1 %
NEUTROPHILS # BLD AUTO: 3.47 K/UL
NEUTROPHILS NFR BLD AUTO: 51.1 %
PLATELET # BLD AUTO: 223 K/UL
POTASSIUM SERPL-SCNC: 4.2 MMOL/L
PROT SERPL-MCNC: 6.3 G/DL
RBC # BLD: 4.84 M/UL
RBC # FLD: 13.7 %
RF+CCP IGG SER-IMP: NEGATIVE
RHEUMATOID FACT SER QL: <10 IU/ML
SODIUM SERPL-SCNC: 141 MMOL/L
TSH SERPL-ACNC: 3.09 UIU/ML
WBC # FLD AUTO: 6.79 K/UL

## 2024-07-30 PROCEDURE — 99205 OFFICE O/P NEW HI 60 MIN: CPT

## 2024-07-30 RX ORDER — TAMSULOSIN HYDROCHLORIDE 0.4 MG/1
CAPSULE ORAL
Refills: 0 | Status: ACTIVE | COMMUNITY

## 2024-07-30 RX ORDER — ENALAPRIL MALEATE 5 MG/1
TABLET ORAL
Refills: 0 | Status: ACTIVE | COMMUNITY

## 2024-07-30 RX ORDER — AMLODIPINE BESYLATE 5 MG/1
TABLET ORAL
Refills: 0 | Status: ACTIVE | COMMUNITY

## 2024-07-30 RX ORDER — METFORMIN HYDROCHLORIDE 850 MG/1
850 TABLET, COATED ORAL
Refills: 0 | Status: ACTIVE | COMMUNITY

## 2024-07-30 NOTE — ASSESSMENT
[FreeTextEntry1] : Generalized OA/ degenerative disease  - labs and imaging studies as below. will call pt with results - conservative treatment of the patient's condition- including rest, ice, heat, anti-inflammatory medications, activity modifications, home stretching and strengthening exercises daily. - PT  Discussed treatment plan with the patient. The patient was given the opportunity to ask questions and all questions were answered to their satisfaction.

## 2024-07-30 NOTE — HISTORY OF PRESENT ILLNESS
[FreeTextEntry1] : 74 year old male with PMH as listed below presents today for an initial evaluation for joint pain  Reports to have long standing hx of joint pain. Pain is worse to BL shoulders, BL elbows/ arms, low back  Reports pain is constant.  Starts in AM and lasts the whole day. Worse with activity and at the end of the day LBP with occasional radicular pain going down his legs  Not currently taking any medications for pain NO falls  denies fever, chills, weight loss, weight gain, fatigue, malaise, denies dry eye, eye pain, eye redness, vision changes, dry mouth, oral ulcers, nasal congestion, difficulty swallowing,  denies ear pain, hearing changes, denies chest pain, chest palpitations, denies sob, denies nausea, vomiting, abdominal pain, diarrhea, constipation, blood in stool, denies dysuria, blood in the urine, denies rashes, photosensitivity, hair loss, skin thickening, denies blood clots,  raynauds  FH: denies FH of autoimmune disease  Labs.chart reviewd

## 2024-07-31 LAB — ANA SER IF-ACNC: NEGATIVE

## 2024-08-20 ENCOUNTER — APPOINTMENT (OUTPATIENT)
Dept: RHEUMATOLOGY | Facility: CLINIC | Age: 74
End: 2024-08-20
Payer: MEDICARE

## 2024-08-20 VITALS
OXYGEN SATURATION: 95 % | BODY MASS INDEX: 35.82 KG/M2 | HEART RATE: 75 BPM | HEIGHT: 65 IN | RESPIRATION RATE: 17 BRPM | DIASTOLIC BLOOD PRESSURE: 88 MMHG | WEIGHT: 215 LBS | SYSTOLIC BLOOD PRESSURE: 148 MMHG | TEMPERATURE: 98 F

## 2024-08-20 DIAGNOSIS — M25.511 PAIN IN RIGHT SHOULDER: ICD-10-CM

## 2024-08-20 DIAGNOSIS — G89.29 PAIN IN RIGHT SHOULDER: ICD-10-CM

## 2024-08-20 DIAGNOSIS — G89.29 LOW BACK PAIN, UNSPECIFIED: ICD-10-CM

## 2024-08-20 DIAGNOSIS — M54.50 LOW BACK PAIN, UNSPECIFIED: ICD-10-CM

## 2024-08-20 DIAGNOSIS — M15.9 POLYOSTEOARTHRITIS, UNSPECIFIED: ICD-10-CM

## 2024-08-20 DIAGNOSIS — M25.512 PAIN IN RIGHT SHOULDER: ICD-10-CM

## 2024-08-20 DIAGNOSIS — M17.0 BILATERAL PRIMARY OSTEOARTHRITIS OF KNEE: ICD-10-CM

## 2024-08-20 DIAGNOSIS — M25.50 PAIN IN UNSPECIFIED JOINT: ICD-10-CM

## 2024-08-20 PROCEDURE — G2211 COMPLEX E/M VISIT ADD ON: CPT

## 2024-08-20 PROCEDURE — 99214 OFFICE O/P EST MOD 30 MIN: CPT

## 2024-08-20 NOTE — HISTORY OF PRESENT ILLNESS
[FreeTextEntry1] : Pt presenting today for a f.u visit for joint pain. Has OA/ degenerative disease.  Recent labs and imaging reviewed with pt. L spine: multilevel degenerative changes BL shoulders: moderate BL glenohumeral and mild BL AC degenerative changes BL elbows: WNL.  Had extensive discussion with pt today about treatment options. At this time the decision was made to start treatment with Tylenol PRN. Start PT. Pain management/ ortho evaluation if no improvement.

## 2024-08-20 NOTE — ASSESSMENT
[FreeTextEntry1] : Generalized OA/ degenerative disease  - conservative treatment of the patient's condition- including rest, ice, heat, anti-inflammatory medications, activity modifications, home stretching and strengthening exercises daily. - PT - pain management evaluation if no improvement  Discussed treatment plan with the patient. The patient was given the opportunity to ask questions and all questions were answered to their satisfaction.

## 2024-11-05 ENCOUNTER — OFFICE (OUTPATIENT)
Dept: URBAN - METROPOLITAN AREA CLINIC 63 | Facility: CLINIC | Age: 74
Setting detail: OPHTHALMOLOGY
End: 2024-11-05
Payer: MEDICARE

## 2024-11-05 DIAGNOSIS — H16.223: ICD-10-CM

## 2024-11-05 DIAGNOSIS — H01.002: ICD-10-CM

## 2024-11-05 DIAGNOSIS — H35.033: ICD-10-CM

## 2024-11-05 DIAGNOSIS — E11.9: ICD-10-CM

## 2024-11-05 DIAGNOSIS — H16.221: ICD-10-CM

## 2024-11-05 DIAGNOSIS — Z96.1: ICD-10-CM

## 2024-11-05 DIAGNOSIS — H16.222: ICD-10-CM

## 2024-11-05 DIAGNOSIS — H01.005: ICD-10-CM

## 2024-11-05 DIAGNOSIS — H35.3131: ICD-10-CM

## 2024-11-05 PROCEDURE — 83861 MICROFLUID ANALY TEARS: CPT | Mod: QW,LT | Performed by: OPHTHALMOLOGY

## 2024-11-05 PROCEDURE — 99214 OFFICE O/P EST MOD 30 MIN: CPT | Performed by: OPHTHALMOLOGY

## 2024-11-05 PROCEDURE — 83861 MICROFLUID ANALY TEARS: CPT | Mod: QW,RT | Performed by: OPHTHALMOLOGY

## 2024-11-05 PROCEDURE — 92134 CPTRZ OPH DX IMG PST SGM RTA: CPT | Performed by: OPHTHALMOLOGY

## 2024-11-05 ASSESSMENT — REFRACTION_MANIFEST
OS_VA1: 20/NI
OS_SPHERE: +2.50
OS_SPHERE: +1.25
OS_CYLINDER: -0.50
OD_VA1: 20/NI
OD_CYLINDER: -1.50
OD_SPHERE: -0.25
OD_SPHERE: +2.50
OS_CYLINDER: SPH
OD_AXIS: 160
OS_AXIS: 050
OD_CYLINDER: SPH

## 2024-11-05 ASSESSMENT — REFRACTION_AUTOREFRACTION
OS_AXIS: 019
OD_AXIS: 115
OD_CYLINDER: -0.25
OS_CYLINDER: -1.50
OD_SPHERE: +0.50
OS_SPHERE: +1.25

## 2024-11-05 ASSESSMENT — SUPERFICIAL PUNCTATE KERATITIS (SPK)
OD_SPK: T
OS_SPK: T

## 2024-11-05 ASSESSMENT — TONOMETRY
OS_IOP_MMHG: 17
OD_IOP_MMHG: 16

## 2024-11-05 ASSESSMENT — KERATOMETRY
OS_K2POWER_DIOPTERS: 43.50
OD_K2POWER_DIOPTERS: 42.75
OD_AXISANGLE_DEGREES: 107
OS_K1POWER_DIOPTERS: 41.75
OS_AXISANGLE_DEGREES: 112
OD_K1POWER_DIOPTERS: 41.50

## 2024-11-05 ASSESSMENT — LID EXAM ASSESSMENTS
OS_BLEPHARITIS: LLL 1+
OD_TRICHIASIS: ABSENT
OD_BLEPHARITIS: RLL 1+

## 2024-11-05 ASSESSMENT — CONFRONTATIONAL VISUAL FIELD TEST (CVF)
OD_FINDINGS: FULL
OS_FINDINGS: FULL

## 2024-11-05 ASSESSMENT — VISUAL ACUITY
OS_BCVA: 20/25
OD_BCVA: 20/20-1

## 2025-05-06 ENCOUNTER — OFFICE (OUTPATIENT)
Dept: URBAN - METROPOLITAN AREA CLINIC 63 | Facility: CLINIC | Age: 75
Setting detail: OPHTHALMOLOGY
End: 2025-05-06
Payer: MEDICARE

## 2025-05-06 DIAGNOSIS — H16.221: ICD-10-CM

## 2025-05-06 DIAGNOSIS — E11.9: ICD-10-CM

## 2025-05-06 DIAGNOSIS — H16.222: ICD-10-CM

## 2025-05-06 DIAGNOSIS — H01.005: ICD-10-CM

## 2025-05-06 DIAGNOSIS — H35.033: ICD-10-CM

## 2025-05-06 DIAGNOSIS — H16.223: ICD-10-CM

## 2025-05-06 DIAGNOSIS — Z96.1: ICD-10-CM

## 2025-05-06 DIAGNOSIS — H35.3131: ICD-10-CM

## 2025-05-06 DIAGNOSIS — H01.002: ICD-10-CM

## 2025-05-06 PROCEDURE — 92250 FUNDUS PHOTOGRAPHY W/I&R: CPT | Performed by: OPHTHALMOLOGY

## 2025-05-06 PROCEDURE — 83861 MICROFLUID ANALY TEARS: CPT | Mod: QW,LT | Performed by: OPHTHALMOLOGY

## 2025-05-06 PROCEDURE — 92014 COMPRE OPH EXAM EST PT 1/>: CPT | Performed by: OPHTHALMOLOGY

## 2025-05-06 PROCEDURE — 83861 MICROFLUID ANALY TEARS: CPT | Mod: QW,RT | Performed by: OPHTHALMOLOGY

## 2025-05-06 ASSESSMENT — KERATOMETRY
OD_K1POWER_DIOPTERS: 41.50
OD_AXISANGLE_DEGREES: 107
OD_K2POWER_DIOPTERS: 42.75
OS_K1POWER_DIOPTERS: 41.75
OS_K2POWER_DIOPTERS: 43.50
OS_AXISANGLE_DEGREES: 112

## 2025-05-06 ASSESSMENT — CONFRONTATIONAL VISUAL FIELD TEST (CVF)
OD_FINDINGS: FULL
OS_FINDINGS: FULL

## 2025-05-06 ASSESSMENT — REFRACTION_AUTOREFRACTION
OS_CYLINDER: -1.75
OD_SPHERE: +0.50
OD_CYLINDER: -0.25
OS_SPHERE: +1.00
OS_AXIS: 016
OD_AXIS: 090

## 2025-05-06 ASSESSMENT — REFRACTION_MANIFEST
OS_CYLINDER: -0.50
OD_CYLINDER: -1.50
OS_CYLINDER: SPH
OD_SPHERE: +2.50
OS_SPHERE: +2.50
OD_SPHERE: -0.25
OD_CYLINDER: SPH
OS_SPHERE: +1.25
OS_AXIS: 050
OD_AXIS: 160
OS_VA1: 20/NI
OD_VA1: 20/NI

## 2025-05-06 ASSESSMENT — TONOMETRY
OD_IOP_MMHG: 17
OS_IOP_MMHG: 16

## 2025-05-06 ASSESSMENT — VISUAL ACUITY
OS_BCVA: 20/20-1
OD_BCVA: 20/20-1

## 2025-05-06 ASSESSMENT — LID EXAM ASSESSMENTS
OS_BLEPHARITIS: LLL 1+
OD_TRICHIASIS: ABSENT
OD_BLEPHARITIS: RLL 1+

## 2025-05-06 ASSESSMENT — SUPERFICIAL PUNCTATE KERATITIS (SPK)
OD_SPK: T
OS_SPK: T